# Patient Record
Sex: FEMALE | Race: WHITE | NOT HISPANIC OR LATINO | Employment: FULL TIME | URBAN - NONMETROPOLITAN AREA
[De-identification: names, ages, dates, MRNs, and addresses within clinical notes are randomized per-mention and may not be internally consistent; named-entity substitution may affect disease eponyms.]

---

## 2017-01-19 ENCOUNTER — OFFICE VISIT - GICH (OUTPATIENT)
Dept: FAMILY MEDICINE | Facility: OTHER | Age: 37
End: 2017-01-19

## 2017-01-19 ENCOUNTER — HISTORY (OUTPATIENT)
Dept: FAMILY MEDICINE | Facility: OTHER | Age: 37
End: 2017-01-19

## 2017-01-19 DIAGNOSIS — B96.89 OTHER SPECIFIED BACTERIAL AGENTS AS THE CAUSE OF DISEASES CLASSIFIED ELSEWHERE: ICD-10-CM

## 2017-01-19 DIAGNOSIS — N89.8 OTHER SPECIFIED NONINFLAMMATORY DISORDERS OF VAGINA (CODE): ICD-10-CM

## 2017-01-19 DIAGNOSIS — B37.31 CANDIDIASIS OF VULVA AND VAGINA: ICD-10-CM

## 2017-01-19 DIAGNOSIS — N76.0 ACUTE VAGINITIS: ICD-10-CM

## 2017-02-28 ENCOUNTER — OFFICE VISIT - GICH (OUTPATIENT)
Dept: FAMILY MEDICINE | Facility: OTHER | Age: 37
End: 2017-02-28

## 2017-02-28 DIAGNOSIS — B96.89 OTHER SPECIFIED BACTERIAL AGENTS AS THE CAUSE OF DISEASES CLASSIFIED ELSEWHERE: ICD-10-CM

## 2017-02-28 DIAGNOSIS — N76.0 ACUTE VAGINITIS: ICD-10-CM

## 2017-02-28 DIAGNOSIS — N89.8 OTHER SPECIFIED NONINFLAMMATORY DISORDERS OF VAGINA (CODE): ICD-10-CM

## 2017-04-13 ENCOUNTER — COMMUNICATION - GICH (OUTPATIENT)
Dept: FAMILY MEDICINE | Facility: OTHER | Age: 37
End: 2017-04-13

## 2017-04-13 DIAGNOSIS — L29.89 OTHER PRURITUS: ICD-10-CM

## 2018-01-03 NOTE — NURSING NOTE
Patient Information     Patient Name MRN Jailene Lopez [id number] Female 1980      Nursing Note by Kelley Martin at 2017  5:00 PM     Author:  Kelley Martin Service:  (none) Author Type:  NURS- Student Practical Nurse     Filed:  2017  5:24 PM Encounter Date:  2017 Status:  Signed     :  Kelley Martin (NURS- Student Practical Nurse)            Patient presents with bacterial vaginosis. Patient states she feels that it never went away. Patient has vaginal odor, cramping. Patient only notice during or after intercourse. Kelley Martin LPN .............2017  5:17 PM

## 2018-01-03 NOTE — PATIENT INSTRUCTIONS
Patient Information     Patient Name Jailene Lambert [id number] Female 1980      Patient Instructions by Pepper Jones NP at 2017  5:47 PM     Author:  Pepper Jones NP  Service:  (none) Author Type:  PHYS- Nurse Practitioner     Filed:  2017  5:47 PM  Encounter Date:  2017 Status:  Addendum     :  Pepper Jones NP (PHYS- Nurse Practitioner)        Related Notes: Original Note by Pepper Jones NP (PHYS- Nurse Practitioner) filed at 2017  5:47 PM               Index Mongolian Related topics   Vaginal Bacteria Overgrowth (Bacterial Vaginosis)   ________________________________________________________________________  KEY POINTS    Bacterial vaginosis (BV) is an overgrowth of a certain type of bacteria in the vagina (birth canal). It is a common condition that may or may not cause symptoms.    Untreated BV may go away on its own. If BV is causing itching, pain, or other problems, you may need antibiotic medicine.    Ask your healthcare provider if there are activities you should avoid and when you can return to your normal activities.  ________________________________________________________________________  What is bacterial vaginosis?  Bacterial vaginosis (BV) is an overgrowth of a certain type of bacteria in the vagina (birth canal). It is a common condition that may or may not cause symptoms.   What is the cause?  It s normal to have some bacteria in the vagina, but sometimes there are too many of certain types of bacteria. Doctors don t know what causes this imbalance of bacteria. One possible cause is douching (cleaning out the vagina with water or other fluids).  Most cases of bacterial vaginosis happen in sexually active women. Women who have more than 1 sexual partner have a greater risk of this problem. However, women who are not sexually active can also have vaginosis. Smoking cigarettes also increases the risk.  What are the symptoms?  Many women don t  have any symptoms. When women do have symptoms, the most common symptom is a discharge from the vagina. The discharge may be gray or yellowish and smell bad. For example, it may smell fishy, especially after sex. You may also have itching around the opening of the vagina. Sometimes BV can cause pain or burning in the vagina that does not go away.  How is it diagnosed?  Your healthcare provider will ask about your symptoms and medical history. You will have a pelvic exam, and your provider will get a sample of vaginal discharge for lab tests.  How is it treated?   Untreated bacterial vaginosis sometimes goes away on its own. Sometimes, if you scratch the area to relieve itching, you may get an infection. Rarely, it may cause vaginal pain that keeps bothering you. If bacterial vaginosis is causing itching, pain, or other problems, it may need to be treated with antibiotics. The medicine for bacterial vaginosis may be taken by mouth or it may be a cream or gel that you put into the vagina. The symptoms usually go away within a few days after you start treatment.   How can I take care of myself?  Follow your healthcare provider's instructions. Ask your provider:    How and when you will get your test results    How long it will take to recover    If there are activities you should avoid and when you can return to your normal activities    How to take care of yourself at home    What symptoms or problems you should watch for and what to do if you have them  Make sure you know when you should come back for a checkup. Keep all appointments for provider visits or tests.  How can I help prevent bacterial vaginosis?    Use latex or polyurethane condoms during foreplay and every time you have vaginal, oral, or anal sex.    Have just 1 sexual partner who is not having sex with anyone else.    If you have had sex and are worried that you may have been infected, see your healthcare provider even if you don t have any  symptoms.    Do not douche.  Developed by [employer].  Adult Advisor 2016.2 published by [employer].  Last modified: 2016-03-23  Last reviewed: 2015-11-02  This content is reviewed periodically and is subject to change as new health information becomes available. The information is intended to inform and educate and is not a replacement for medical evaluation, advice, diagnosis or treatment by a healthcare professional.  References   Adult Advisor 2016.2 Index    Copyright   2016 [employer], a division of [employer] All rights reserved.

## 2018-01-03 NOTE — PROGRESS NOTES
Patient Information     Patient Name MRN Sex Jailene Vincent [id number] Female 1980      Progress Notes by Pepper Jones NP at 2017  4:00 PM     Author:  Pepper Jones NP Service:  (none) Author Type:  PHYS- Nurse Practitioner     Filed:  3/2/2017  2:07 PM Encounter Date:  2017 Status:  Signed     :  Pepper Jones NP (PHYS- Nurse Practitioner)            HPI:    Jailene New is a 36 y.o. female who presents to clinic today for vaginal concerns. She is having increased odor after intercourse. No vaginal discharge, dysuria. She was tx for BV and yeast 2017, sx did improve. Concerned about a yeast infection, would like to have this tested. She is on doxycycline for acne, this started 2017.     Past Medical History      Diagnosis   Date     ADD (attention deficit disorder with hyperactivity)       teen onset/meds thru [facility]      History of pregnancy       G2Para 2-0-0-2      Past Surgical History       Procedure   Laterality Date     Jaw surgery         section   2009     breech presentation       Bunionectomy   3-     left       Social History       Substance Use Topics         Smoking status:   Never Smoker     Smokeless tobacco:   Never Used     Alcohol use   Yes      Comment: occasionally      Current Outpatient Prescriptions       Medication  Sig Dispense Refill     ADDERALL XR 20 mg Extended-Release capsule TAKE 1 CAPSULE BY MOUTH EVERY DAY  IN THE MORNING AND TAKE 1 CAPSULE BY MOUTH EVERY DAY AT 11 IN THE MORNING (FILL AFTER 2016)  0     cyclobenzaprine (FLEXERIL) 10 mg tablet Take 10 mg by mouth once daily.  0     dextroamphetamine-amphetamine (ADDERALL) 10 mg tablet TAKE ONE TABLET BY MOUTH AT 2PM (FILL AFTER 2016)  0     HYDROcodone-acetaminophen,  mg, (NORCO )  mg per tablet Take 10 tablets by mouth every 6 hours if needed  0     ibuprofen (ADVIL; MOTRIN) 600 mg tablet Take 600 mg by mouth every 6 hours if  needed.  0     TRINESSA LO 0.18/0.215/0.25 mg-25 mcg tablet TK 1 T PO ONE TIME A DAY  3     valACYclovir (VALTREX) 500 mg tablet Take 500 mg by mouth one time. Take 4 tablets by mouth at first sign of attack and 4 more 12 hours later  2     No current facility-administered medications for this visit.      Medications have been reviewed by me and are current to the best of my knowledge and ability.    No Known Allergies    ROS:  Pertinent positives and negatives are noted in HPI.    EXAM:  General appearance: well appearing female, in no acute distress  Genitourinary Exam Female: External genitalia, vulva and vagina appear normal. Speculum exam reveals scant clear drainage. Bimanual exam reveals normal uterus and adnexa with no masses, nontender urethra and bladder.   Psychological: normal affect, alert and pleasant  Lab:   Results for orders placed or performed in visit on 02/28/17      WET PREP GENITAL      Result  Value Ref Range    TRICHOMONAS               None Seen None Seen    YEAST                     None Seen None Seen    CLUE CELLS                Present (A) None Seen         ASSESSMENT/PLAN:    ICD-10-CM    1. BV (bacterial vaginosis) N76.0 metroNIDAZOLE 0.75% vaginal (METROGEL) 0.75 % vaginal gel     B96.89    2. Vaginal odor N89.8 WET PREP GENITAL      WET PREP GENITAL   Testing shows BV. Tx with vaginal metronidazole. Reviewed need to complete all antibiotics. Discussed typical course of illness, symptomatic treatment and when to return to clinic. Patient in agreement with plan and all questions were answered.     Patient Instructions      Index English Related topics   Vaginal Bacteria Overgrowth (Bacterial Vaginosis)   ________________________________________________________________________  KEY POINTS    Bacterial vaginosis (BV) is an overgrowth of a certain type of bacteria in the vagina (birth canal). It is a common condition that may or may not cause symptoms.    Untreated BV may go away on its  own. If BV is causing itching, pain, or other problems, you may need antibiotic medicine.    Ask your healthcare provider if there are activities you should avoid and when you can return to your normal activities.  ________________________________________________________________________  What is bacterial vaginosis?  Bacterial vaginosis (BV) is an overgrowth of a certain type of bacteria in the vagina (birth canal). It is a common condition that may or may not cause symptoms.   What is the cause?  It s normal to have some bacteria in the vagina, but sometimes there are too many of certain types of bacteria. Doctors don t know what causes this imbalance of bacteria. One possible cause is douching (cleaning out the vagina with water or other fluids).  Most cases of bacterial vaginosis happen in sexually active women. Women who have more than 1 sexual partner have a greater risk of this problem. However, women who are not sexually active can also have vaginosis. Smoking cigarettes also increases the risk.  What are the symptoms?  Many women don t have any symptoms. When women do have symptoms, the most common symptom is a discharge from the vagina. The discharge may be gray or yellowish and smell bad. For example, it may smell fishy, especially after sex. You may also have itching around the opening of the vagina. Sometimes BV can cause pain or burning in the vagina that does not go away.  How is it diagnosed?  Your healthcare provider will ask about your symptoms and medical history. You will have a pelvic exam, and your provider will get a sample of vaginal discharge for lab tests.  How is it treated?   Untreated bacterial vaginosis sometimes goes away on its own. Sometimes, if you scratch the area to relieve itching, you may get an infection. Rarely, it may cause vaginal pain that keeps bothering you. If bacterial vaginosis is causing itching, pain, or other problems, it may need to be treated with antibiotics. The  medicine for bacterial vaginosis may be taken by mouth or it may be a cream or gel that you put into the vagina. The symptoms usually go away within a few days after you start treatment.   How can I take care of myself?  Follow your healthcare provider's instructions. Ask your provider:    How and when you will get your test results    How long it will take to recover    If there are activities you should avoid and when you can return to your normal activities    How to take care of yourself at home    What symptoms or problems you should watch for and what to do if you have them  Make sure you know when you should come back for a checkup. Keep all appointments for provider visits or tests.  How can I help prevent bacterial vaginosis?    Use latex or polyurethane condoms during foreplay and every time you have vaginal, oral, or anal sex.    Have just 1 sexual partner who is not having sex with anyone else.    If you have had sex and are worried that you may have been infected, see your healthcare provider even if you don t have any symptoms.    Do not douche.  Developed by [employer].  Adult Advisor 2016.3 published by [employer].  Last modified: 2016-03-23  Last reviewed: 2015-11-02  This content is reviewed periodically and is subject to change as new health information becomes available. The information is intended to inform and educate and is not a replacement for medical evaluation, advice, diagnosis or treatment by a healthcare professional.  References   Adult Advisor 2016.3 Index    Copyright   2016 [employer], a division of [employer] All rights reserved.

## 2018-01-03 NOTE — PATIENT INSTRUCTIONS
Patient Information     Patient Name Jailene Lambert [id number] Female 1980      Patient Instructions by Pepper Jones NP at 2017  5:18 PM     Author:  Pepper Jones NP  Service:  (none) Author Type:  PHYS- Nurse Practitioner     Filed:  2017  5:19 PM  Encounter Date:  2017 Status:  Addendum     :  Pepper Jones NP (PHYS- Nurse Practitioner)        Related Notes: Original Note by Pepper Jones NP (PHYS- Nurse Practitioner) filed at 2017  5:18 PM               Index Venezuelan Related topics   Vaginal Bacteria Overgrowth (Bacterial Vaginosis)   ________________________________________________________________________  KEY POINTS    Bacterial vaginosis (BV) is an overgrowth of a certain type of bacteria in the vagina (birth canal). It is a common condition that may or may not cause symptoms.    Untreated BV may go away on its own. If BV is causing itching, pain, or other problems, you may need antibiotic medicine.    Ask your healthcare provider if there are activities you should avoid and when you can return to your normal activities.  ________________________________________________________________________  What is bacterial vaginosis?  Bacterial vaginosis (BV) is an overgrowth of a certain type of bacteria in the vagina (birth canal). It is a common condition that may or may not cause symptoms.   What is the cause?  It s normal to have some bacteria in the vagina, but sometimes there are too many of certain types of bacteria. Doctors don t know what causes this imbalance of bacteria. One possible cause is douching (cleaning out the vagina with water or other fluids).  Most cases of bacterial vaginosis happen in sexually active women. Women who have more than 1 sexual partner have a greater risk of this problem. However, women who are not sexually active can also have vaginosis. Smoking cigarettes also increases the risk.  What are the symptoms?  Many women don t  have any symptoms. When women do have symptoms, the most common symptom is a discharge from the vagina. The discharge may be gray or yellowish and smell bad. For example, it may smell fishy, especially after sex. You may also have itching around the opening of the vagina. Sometimes BV can cause pain or burning in the vagina that does not go away.  How is it diagnosed?  Your healthcare provider will ask about your symptoms and medical history. You will have a pelvic exam, and your provider will get a sample of vaginal discharge for lab tests.  How is it treated?   Untreated bacterial vaginosis sometimes goes away on its own. Sometimes, if you scratch the area to relieve itching, you may get an infection. Rarely, it may cause vaginal pain that keeps bothering you. If bacterial vaginosis is causing itching, pain, or other problems, it may need to be treated with antibiotics. The medicine for bacterial vaginosis may be taken by mouth or it may be a cream or gel that you put into the vagina. The symptoms usually go away within a few days after you start treatment.   How can I take care of myself?  Follow your healthcare provider's instructions. Ask your provider:    How and when you will get your test results    How long it will take to recover    If there are activities you should avoid and when you can return to your normal activities    How to take care of yourself at home    What symptoms or problems you should watch for and what to do if you have them  Make sure you know when you should come back for a checkup. Keep all appointments for provider visits or tests.  How can I help prevent bacterial vaginosis?    Use latex or polyurethane condoms during foreplay and every time you have vaginal, oral, or anal sex.    Have just 1 sexual partner who is not having sex with anyone else.    If you have had sex and are worried that you may have been infected, see your healthcare provider even if you don t have any  symptoms.    Do not douche.  Developed by [employer].  Adult Advisor 2016.3 published by [employer].  Last modified: 2016-03-23  Last reviewed: 2015-11-02  This content is reviewed periodically and is subject to change as new health information becomes available. The information is intended to inform and educate and is not a replacement for medical evaluation, advice, diagnosis or treatment by a healthcare professional.  References   Adult Advisor 2016.3 Index    Copyright   2016 [employer], a division of [employer] All rights reserved.

## 2018-01-03 NOTE — PROGRESS NOTES
Patient Information     Patient Name MRN Sex Jailene Vincent [id number] Female 1980      Progress Notes by Pepper Jones NP at 2017  5:00 PM     Author:  Pepper Jones NP Service:  (none) Author Type:  PHYS- Nurse Practitioner     Filed:  2017  5:58 PM Encounter Date:  2017 Status:  Signed     :  Pepper Jones NP (PHYS- Nurse Practitioner)            HPI:    Jailene New is a 36 y.o. female who presents to clinic today for vaginal concerns. She was treated for BV a couple months ago. Does not feel sx fully resolved. She is having pelvic cramping and increased vaginal odor. No increase in discharge. She denies any dysuria. No changes in sexual partners recently. Denies STD concerns.     Past Medical History      Diagnosis   Date     ADD (attention deficit disorder with hyperactivity)       teen onset/meds thru [facility]      History of pregnancy       G2Para 2-0-0-2      Past Surgical History       Procedure   Laterality Date     Jaw surgery         section   2009     breech presentation       Bunionectomy   3-     left       Social History       Substance Use Topics         Smoking status:   Never Smoker     Smokeless tobacco:   Never Used     Alcohol use   Yes      Comment: occasionally      Current Outpatient Prescriptions       Medication  Sig Dispense Refill     ADDERALL XR 20 mg Extended-Release capsule TAKE 1 CAPSULE BY MOUTH EVERY DAY  IN THE MORNING AND TAKE 1 CAPSULE BY MOUTH EVERY DAY AT 11 IN THE MORNING (FILL AFTER 2016)  0     dextroamphetamine-amphetamine (ADDERALL) 10 mg tablet TAKE ONE TABLET BY MOUTH AT 2PM (FILL AFTER 2016)  0     TRINESSA LO 0.18/0.215/0.25 mg-25 mcg tablet TK 1 T PO ONE TIME A DAY  3     No current facility-administered medications for this visit.      Medications have been reviewed by me and are current to the best of my knowledge and ability.    No Known Allergies    ROS:  Pertinent positives and  negatives are noted in HPI.    EXAM:  General appearance: well appearing female, in no acute distress  Genitourinary Exam Female: External genitalia, vulva and vagina appear normal. Normal Vaginal exam. Wet prep obtained.   Psychological: normal affect, alert and pleasant  Lab:   Results for orders placed or performed in visit on 01/19/17      WET PREP GENITAL      Result  Value Ref Range    TRICHOMONAS               None Seen None Seen    YEAST                     Present (A) None Seen    CLUE CELLS                Present (A) None Seen         ASSESSMENT/PLAN:    ICD-10-CM    1. Vaginal irritation N89.8 WET PREP GENITAL      WET PREP GENITAL   2. Yeast vaginitis B37.3 fluconazole (DIFLUCAN) 150 mg tablet   3. Bacterial vaginosis N76.0 metroNIDAZOLE (FLAGYL) 500 mg tablet     B96.89    Wet prep shows positive for yeast and bacterial vaginosis. Tx with diflucan and metronidazole for these. Encouraged f/u with PCP if sx persist or do not improve. Reviewed need to complete all antibiotics. Discussed typical course of illness, symptomatic treatment and when to return to clinic. Patient in agreement with plan and all questions were answered.     Patient Instructions      Index Welsh Related topics   Vaginal Bacteria Overgrowth (Bacterial Vaginosis)   ________________________________________________________________________  KEY POINTS    Bacterial vaginosis (BV) is an overgrowth of a certain type of bacteria in the vagina (birth canal). It is a common condition that may or may not cause symptoms.    Untreated BV may go away on its own. If BV is causing itching, pain, or other problems, you may need antibiotic medicine.    Ask your healthcare provider if there are activities you should avoid and when you can return to your normal activities.  ________________________________________________________________________  What is bacterial vaginosis?  Bacterial vaginosis (BV) is an overgrowth of a certain type of bacteria in  the vagina (birth canal). It is a common condition that may or may not cause symptoms.   What is the cause?  It s normal to have some bacteria in the vagina, but sometimes there are too many of certain types of bacteria. Doctors don t know what causes this imbalance of bacteria. One possible cause is douching (cleaning out the vagina with water or other fluids).  Most cases of bacterial vaginosis happen in sexually active women. Women who have more than 1 sexual partner have a greater risk of this problem. However, women who are not sexually active can also have vaginosis. Smoking cigarettes also increases the risk.  What are the symptoms?  Many women don t have any symptoms. When women do have symptoms, the most common symptom is a discharge from the vagina. The discharge may be gray or yellowish and smell bad. For example, it may smell fishy, especially after sex. You may also have itching around the opening of the vagina. Sometimes BV can cause pain or burning in the vagina that does not go away.  How is it diagnosed?  Your healthcare provider will ask about your symptoms and medical history. You will have a pelvic exam, and your provider will get a sample of vaginal discharge for lab tests.  How is it treated?   Untreated bacterial vaginosis sometimes goes away on its own. Sometimes, if you scratch the area to relieve itching, you may get an infection. Rarely, it may cause vaginal pain that keeps bothering you. If bacterial vaginosis is causing itching, pain, or other problems, it may need to be treated with antibiotics. The medicine for bacterial vaginosis may be taken by mouth or it may be a cream or gel that you put into the vagina. The symptoms usually go away within a few days after you start treatment.   How can I take care of myself?  Follow your healthcare provider's instructions. Ask your provider:    How and when you will get your test results    How long it will take to recover    If there are  activities you should avoid and when you can return to your normal activities    How to take care of yourself at home    What symptoms or problems you should watch for and what to do if you have them  Make sure you know when you should come back for a checkup. Keep all appointments for provider visits or tests.  How can I help prevent bacterial vaginosis?    Use latex or polyurethane condoms during foreplay and every time you have vaginal, oral, or anal sex.    Have just 1 sexual partner who is not having sex with anyone else.    If you have had sex and are worried that you may have been infected, see your healthcare provider even if you don t have any symptoms.    Do not douche.  Developed by [employer].  Adult Advisor 2016.2 published by [employer].  Last modified: 2016-03-23  Last reviewed: 2015-11-02  This content is reviewed periodically and is subject to change as new health information becomes available. The information is intended to inform and educate and is not a replacement for medical evaluation, advice, diagnosis or treatment by a healthcare professional.  References   Adult Advisor 2016.2 Index    Copyright   2016 [employer], a division of [employer] All rights reserved.

## 2018-01-04 NOTE — TELEPHONE ENCOUNTER
Patient Information     Patient Name MRN Sex Jailene Vincent [id number] Female 1980      Telephone Encounter by Gosselin, Norma J at 2017  3:40 PM     Author:  Gosselin, Norma J Service:  (none) Author Type:  (none)     Filed:  2017  3:40 PM Encounter Date:  2017 Status:  Signed     :  Gosselin, Norma J            After patient's name and date of birth verified, patient given the below information.  Norma J Gosselin LPN....................  2017   3:40 PM

## 2018-01-04 NOTE — TELEPHONE ENCOUNTER
Patient Information     Patient Name MRN Jailene Lopez [id number] Female 1980      Telephone Encounter by Ariana Nogueira PA-C at 2017  3:33 PM     Author:  Ariana Nogueira PA-C Service:  (none) Author Type:  PHYS- Physician Assistant     Filed:  2017  3:34 PM Encounter Date:  2017 Status:  Signed     :  Ariana Nogueira PA-C (PHYS- Physician Assistant)            Sent diflucan to the pharmacy.   Ariana Nogueira PA-C ....................  2017   3:33 PM

## 2018-01-04 NOTE — TELEPHONE ENCOUNTER
"Patient Information     Patient Name MRN Jailene oLpez [id number] Female 1980      Telephone Encounter by Austin Gold RN at 2017 12:37 PM     Author:  Austin Gold RN Service:  (none) Author Type:  NURS- Registered Nurse     Filed:  2017 12:50 PM Encounter Date:  2017 Status:  Signed     :  Austin Gold RN (NURS- Registered Nurse)            Patient states she had tried monistat over the counter. Provided minimal relief of overall symptoms but did help with the itching. Patient doesn't feel that her symptoms are consistent with bacterial vaginosis (had this per chart review on 17). Would like to be treated with oral Diflucan if possible. Patient understands that if diflucan is ordered and she doesn't see improvement that she will need to be seen in the office. Patient will call back or be seen with worsening symptoms. Would like request sent to PCP's teamlet for her consideration/approval since PCP is out of the office and would like a call back today please.    Reason for Disposition    Abnormal color vaginal discharge (i.e., yellow, green, gray)    Answer Assessment - Initial Assessment Questions  1. DISCHARGE: \"Describe the discharge.\" (e.g., white, yellow, green, gray, foamy, cottage cheese-like)      Yes is having vaginal discharge. Is a whitish yellow. Is cottage cheese consistency  2. ODOR: \"Is there a bad odor?\"      No  3. ONSET: \"When did the discharge begin?\"      Started last week, beginning of last week.  4. RASH: \"Is there a rash in that area?\" If so, ask: \"Describe it.\" (e.g., redness, blisters, sores, bumps)      Itches, did use monistat and that helped with symptoms  5. ABDOMINAL PAIN: \"Are you having any abdominal pain?\" If yes: \"What does it feel like? \" (e.g., crampy, dull, intermittent, constant)       No  6. ABDOMINAL PAIN SEVERITY: If present, ask: \"How bad is it?\"  (e.g., mild, moderate, severe)   - MILD - doesn't interfere with normal " "activities    - MODERATE - interferes with normal activities or awakens from sleep    - SEVERE - patient doesn't want to move (R/O peritonitis)       N/A  7. CAUSE: \"What do you think is causing the discharge?\"      Yeast infection  8. OTHER SYMPTOMS: \"Do you have any other symptoms?\" (e.g., fever, itching, vaginal bleeding, pain with urination)      Itching, does have some pain with urination  9. PREGNANCY: \"Is there any chance you are pregnant?\" \"When was your last menstrual period?\"      Denies    Protocols used: ADULT VAGINAL NNAJTCTOR-S-BZ    Austin Gold RN ....................  4/13/2017   12:49 PM        "

## 2018-01-26 VITALS
TEMPERATURE: 97.8 F | HEIGHT: 67 IN | SYSTOLIC BLOOD PRESSURE: 122 MMHG | DIASTOLIC BLOOD PRESSURE: 76 MMHG | WEIGHT: 130 LBS | BODY MASS INDEX: 20.4 KG/M2 | HEART RATE: 60 BPM

## 2018-01-26 VITALS — HEART RATE: 80 BPM | HEIGHT: 67 IN | WEIGHT: 133 LBS | TEMPERATURE: 98.7 F | BODY MASS INDEX: 20.88 KG/M2

## 2018-01-31 ENCOUNTER — DOCUMENTATION ONLY (OUTPATIENT)
Dept: FAMILY MEDICINE | Facility: OTHER | Age: 38
End: 2018-01-31

## 2018-01-31 PROBLEM — T78.40XA ALLERGIC STATE: Status: ACTIVE | Noted: 2018-01-31

## 2018-01-31 PROBLEM — F98.8 ATTENTION DEFICIT DISORDER: Status: ACTIVE | Noted: 2018-01-31

## 2018-01-31 PROBLEM — Z30.9 CONTRACEPTIVE MANAGEMENT: Status: ACTIVE | Noted: 2018-01-31

## 2018-01-31 PROBLEM — F41.1 ANXIETY STATE: Status: ACTIVE | Noted: 2018-01-31

## 2018-01-31 RX ORDER — NORGESTIMATE AND ETHINYL ESTRADIOL 7DAYSX3 LO
1 KIT ORAL DAILY
Status: ON HOLD | COMMUNITY
Start: 2016-11-04 | End: 2018-04-13

## 2018-01-31 RX ORDER — VALACYCLOVIR HYDROCHLORIDE 500 MG/1
500 TABLET, FILM COATED ORAL
COMMUNITY
Start: 2017-02-15 | End: 2023-05-09

## 2018-01-31 RX ORDER — DEXTROAMPHETAMINE SACCHARATE, AMPHETAMINE ASPARTATE, DEXTROAMPHETAMINE SULFATE AND AMPHETAMINE SULFATE 2.5; 2.5; 2.5; 2.5 MG/1; MG/1; MG/1; MG/1
1 TABLET ORAL
COMMUNITY
Start: 2016-11-05 | End: 2018-04-10

## 2018-01-31 RX ORDER — IBUPROFEN 600 MG/1
600 TABLET, FILM COATED ORAL EVERY 6 HOURS PRN
COMMUNITY
Start: 2017-02-04 | End: 2019-11-18

## 2018-01-31 RX ORDER — DEXTROAMPHETAMINE SACCHARATE, AMPHETAMINE ASPARTATE MONOHYDRATE, DEXTROAMPHETAMINE SULFATE AND AMPHETAMINE SULFATE 5; 5; 5; 5 MG/1; MG/1; MG/1; MG/1
CAPSULE, EXTENDED RELEASE ORAL
COMMUNITY
Start: 2016-11-05 | End: 2018-06-06

## 2018-01-31 RX ORDER — CYCLOBENZAPRINE HCL 10 MG
10 TABLET ORAL DAILY
COMMUNITY
Start: 2017-02-04 | End: 2018-04-05

## 2018-01-31 RX ORDER — HYDROCODONE BITARTRATE AND ACETAMINOPHEN 10; 325 MG/1; MG/1
10 TABLET ORAL EVERY 6 HOURS PRN
COMMUNITY
Start: 2017-02-04 | End: 2018-04-05

## 2018-04-05 ENCOUNTER — OFFICE VISIT (OUTPATIENT)
Dept: SURGERY | Facility: OTHER | Age: 38
End: 2018-04-05
Attending: SURGERY
Payer: COMMERCIAL

## 2018-04-05 VITALS
DIASTOLIC BLOOD PRESSURE: 90 MMHG | BODY MASS INDEX: 19.7 KG/M2 | HEIGHT: 68 IN | SYSTOLIC BLOOD PRESSURE: 138 MMHG | WEIGHT: 130 LBS

## 2018-04-05 DIAGNOSIS — Z30.2 REQUEST FOR STERILIZATION: ICD-10-CM

## 2018-04-05 DIAGNOSIS — K43.9 EPIGASTRIC HERNIA: Primary | ICD-10-CM

## 2018-04-05 PROCEDURE — 99203 OFFICE O/P NEW LOW 30 MIN: CPT | Performed by: SURGERY

## 2018-04-05 NOTE — MR AVS SNAPSHOT
"              After Visit Summary   4/5/2018    Jailene New    MRN: [id number]           Patient Information     Date Of Birth          1980        Visit Information        Provider Department      4/5/2018 3:50 PM Bobby Jones MD [facility]        Today's Diagnoses     Epigastric hernia    -  1    Request for sterilization           Follow-ups after your visit        Your next 10 appointments already scheduled     Apr 26, 2018  3:40 PM CDT   Return Visit with Bobby Jones MD   [facility] ([facility])    [address]  [place] [id number] [phone]              Who to contact     If you have questions or need follow up information about today's clinic visit or your schedule please contact [facility] directly at [phone].  Normal or non-critical lab and imaging results will be communicated to you by [employer], letter or phone within 4 business days after the clinic has received the results. If you do not hear from us within 7 days, please contact the clinic through [employer] or phone. If you have a critical or abnormal lab result, we will notify you by phone as soon as possible.  Submit refill requests through [employer] or call your pharmacy and they will forward the refill request to us. Please allow 3 business days for your refill to be completed.          Additional Information About Your Visit        MyChart Information     HitFix lets you send messages to your doctor, view your test results, renew your prescriptions, schedule appointments and more. To sign up, go to [email or web address] . Click on \"Log in\" on the left side of the screen, which will take you to the Welcome page. Then click on \"Sign up Now\" on the right side of the page.     You will be asked to enter the access code listed below, as well as some personal information. Please follow the directions to create your username " "and password.     Your access code is: [email or web address]  Expires: 2018  4:30 PM     Your access code will  in 90 days. If you need help or a new code, please call your [place] clinic or [phone].        Care EveryWhere ID     This is your Care EveryWhere ID. This could be used by other organizations to access your [place] medical records  [id number]        Your Vitals Were     Height Last Period Breastfeeding? BMI (Body Mass Index)          5' 7.5\" (1.715 m) 2018 (Approximate) No 20.06 kg/m2         Blood Pressure from Last 3 Encounters:   18 138/90   17 122/76   16 120/72    Weight from Last 3 Encounters:   18 130 lb (59 kg)   17 133 lb (60.3 kg)   17 130 lb (59 kg)              Today, you had the following     No orders found for display         Today's Medication Changes          These changes are accurate as of 18  4:30 PM.  If you have any questions, ask your nurse or doctor.               Stop taking these medicines if you haven't already. Please contact your care team if you have questions.     cyclobenzaprine 10 MG tablet   Commonly known as:  FLEXERIL   Stopped by:  Bobby Jones MD           HYDROcodone-acetaminophen  MG per tablet   Commonly known as:  NORCO   Stopped by:  Bobby Jones MD                    Primary Care Provider Office Phone # Fax #    Kayla C Meng Olvera [phone] [address] [facility] [address]        Equal Access to Services     [facility] AH: Hadii kira Khan, wanataliada lulina, qaybta zairaalmichelle hernandez. So [facility] [phone].    ATENCIÓN: Si habla español, tiene a torre disposición servicios gratuitos de asistencia lingüística. Llame al [phone].    We comply with applicable federal civil rights laws and [place] laws. We do not discriminate on the basis of race, color, national origin, age, disability, sex, " sexual orientation, or gender identity.            Thank you!     Thank you for choosing [facility] AND [facility]  for your care. Our goal is always to provide you with excellent care. Hearing back from our patients is one way we can continue to improve our services. Please take a few minutes to complete the written survey that you may receive in the mail after your visit with us. Thank you!             Your Updated Medication List - Protect others around you: Learn how to safely use, store and throw away your medicines at www.disposemymeds.org.          This list is accurate as of 4/5/18  4:30 PM.  Always use your most recent med list.                   Brand Name Dispense Instructions for use Diagnosis    * ADDERALL XR 20 MG per 24 hr capsule   Generic drug:  amphetamine-dextroamphetamine      TAKE 1 CAPSULE BY MOUTH EVERY DAY  IN THE MORNING AND TAKE 1 CAPSULE BY MOUTH EVERY DAY AT 11 IN THE MORNING (FILL AFTER 11/1/2016)        * amphetamine-dextroamphetamine 10 MG per tablet    ADDERALL     Take 1 tablet by mouth        ibuprofen 600 MG tablet    ADVIL/MOTRIN     Take 600 mg by mouth every 6 hours as needed        TRINESSA LO 0.18/0.215/0.25 MG-25 MCG per tablet   Generic drug:  norgestim-eth estrad triphasic      Take 1 tablet by mouth daily        valACYclovir 500 MG tablet    VALTREX     Take 500 mg by mouth        * Notice:  This list has 2 medication(s) that are the same as other medications prescribed for you. Read the directions carefully, and ask your doctor or other care provider to review them with you.

## 2018-04-05 NOTE — PROGRESS NOTES
GENERAL SURGERY OFFICE CONSULTATION NOTE    Patient Name: Jailene New  Address: [address] [place] [address]  Age:37 year old  Sex: female     Primary Care Physician: Kayla Dorantes    I wasrequested to see this patient in consultation by Kayla Dorantes for evaluation of abdominal pain/bulge. A copy of my findings and recommendations will be sent to Kayla Dorantes.    HPI:   The patient is 37 year old female with umbilicalbulge and pain. The patient first noted this a few weeks ago. The bulge has been getting bigger and more uncomfortable. No nausea or vomiting. No problems with new diarrhea or constipation. No skin redness or rash. No previous umbilical hernia surgery. Previous  X 2.  Pt would like to pursue tubal ligation at time of hernia repair.       REVIEW OF SYSTEMS  GENERAL: No fevers or chills. Denies fatigue, recent weight loss.  HEENT: No sinus drainage. No changes with vision or hearing. No difficulty swallowing.   LYMPHATICS:  No swollen nodes inaxilla, neck or groin.  CARDIOVASCULAR: Denies chest pain, palpitations and dyspnea on exertion.  PULMONARY: No shortness of breath or cough. No increase in sputum production.  GI: Denies melena, bright red blood in stools. No hematemesis. No constipation or diarrhea.  : No dysuria or hematuria.  SKIN: No recent rashes or ulcers.   HEMATOLOGY:  No history of easy bruising or bleeding.  ENDOCRINE:  No history of diabetes orthyroid problems.  NEUROLOGY:  No history of seizures or headaches. No motor or sensory changes.     PAST MEDICAL HISTORY    Past Medical History:   Diagnosis Date     Attention-deficit hyperactivity disorder     teen onset/meds thru [facility]     Personal history of other medical treatment (CODE)     G2Para 2-0-0-2       PAST SURGICAL HISTORY    Past Surgical History:   Procedure Laterality Date     BUNIONECTOMY      3-,left      SECTION      ,breech presentation      "OTHER SURGICAL HISTORY      06/08,108917,OTHER       CURRENT MEDS  Current Outpatient Prescriptions   Medication     amphetamine-dextroamphetamine (ADDERALL XR) 20 MG per 24 hr capsule     amphetamine-dextroamphetamine (ADDERALL) 10 MG per tablet     ibuprofen (ADVIL/MOTRIN) 600 MG tablet     norgestim-eth estrad triphasic (TRINESSA LO) 0.18/0.215/0.25 MG-25 MCG per tablet     valACYclovir (VALTREX) 500 MG tablet     No current facility-administered medications for this visit.      ALLERGIES/SENSITIVITIES  No Known Allergies  FAMILY HISTORY    Family History   Problem Relation Age of Onset     Other - See Comments Mother      ADD     Other - See Comments Sister      ADD     Breast Cancer Maternal Grandmother      Cancer-breast,and lung cancer       SOCIALHISTORY    Social History     Social History     Marital status:      Spouse name: N/A     Number of children: N/A     Years of education: 16     Occupational History     Not on file.     Social History Main Topics     Smoking status: Never Smoker     Smokeless tobacco: Never Used     Alcohol use Yes      Comment: Alcoholic Drinks/day: occasionally     Drug use: Not on file      Comment: Drug use: No     Sexual activity: Yes     Partners: Male     Other Topics Concern     Not on file     Social History Narrative    Elementary education degree, in Masters program [employer] 2015.  [employer] teacher at [employer]    Children:  Yoselyn Griffith       2011       PHYSICAL EXAM  /90 (BP Location: Right arm, Patient Position: Sitting, Cuff Size: Adult Regular)  Ht 5' 7.5\" (1.715 m)  Wt 130 lb (59 kg)  LMP 03/13/2018 (Approximate)  Breastfeeding? No  BMI 20.06 kg/m2    Body mass index is 20.06 kg/(m^2).      GENERAL: Healthy appearing patient in no acute distress. Pleasant and cooperative with exam and interview.   HEENT: Head-normocephalic. Eyes-no scleralicterus, pupils equal, round, and reactive to light. Nose- no nasal drainage. No lesions. Mouth-oral mucosa " pink and moist, no lesions.  NECK: Supple. No thyroid nodules. Trachea midline.  LYMPHATICS:  No cervical,axillary or supraclavicular adenopathy.  CV: Regular rate and rhythm, no murmurs. No peripheral edema.  LUNGS:  No respiratory distress. Clear bilaterally to auscultation.  ABDOMEN: Non distended. Bowel soundsactive. Soft, non-tender, no hepatosplenomegaly. Epigastric hernia noted,  Mild tenderness, reducible. No peritoneal signs.  SKIN: Pink, warm and dry. No jaundice. No rash.  NEURO:  Cranial nerves II-XII grossly intact.Alert and oriented.  PSYCH: Appropriate mood and affect.    IMAGING/LAB      CONSULTATION ASSESSMENT AND PLAN/RECOMMENDATIONS:   37 year old female with epigastric ventral hernia and desire for sterilization.      I discussed with the patient the pathophysiology of ventral hernias. I explained the risks, benefits and alternatives to repair of epigastric hernia with mesh. We specifically discussed the risks of infection, bleeding, injury to intra-abdominal organs, mesh complications and hernia recurrence. We discussed post operative limitations and expected recovery time. The patient's questions were answered and the patient wishes to proceed with repair. Informed consent paperwork was completed. This will be scheduled at a time that is convenient for the patient. The patient will call with questions or concerns prior to the procedure.    Bobby Jones MD on 4/5/2018 at 4:25 PM

## 2018-04-10 ENCOUNTER — ANESTHESIA EVENT (OUTPATIENT)
Dept: SURGERY | Facility: OTHER | Age: 38
End: 2018-04-10
Payer: COMMERCIAL

## 2018-04-13 ENCOUNTER — HOSPITAL ENCOUNTER (OUTPATIENT)
Facility: OTHER | Age: 38
Discharge: HOME | End: 2018-04-13
Attending: SURGERY | Admitting: SURGERY
Payer: COMMERCIAL

## 2018-04-13 ENCOUNTER — SURGERY (OUTPATIENT)
Age: 38
End: 2018-04-13

## 2018-04-13 ENCOUNTER — ANESTHESIA (OUTPATIENT)
Dept: SURGERY | Facility: OTHER | Age: 38
End: 2018-04-13
Payer: COMMERCIAL

## 2018-04-13 VITALS
RESPIRATION RATE: 19 BRPM | TEMPERATURE: 98.8 F | SYSTOLIC BLOOD PRESSURE: 143 MMHG | BODY MASS INDEX: 20 KG/M2 | OXYGEN SATURATION: 99 % | WEIGHT: 129.6 LBS | DIASTOLIC BLOOD PRESSURE: 98 MMHG

## 2018-04-13 DIAGNOSIS — Z09 S/P EPIGASTRIC HERNIA REPAIR, FOLLOW-UP EXAM: Primary | ICD-10-CM

## 2018-04-13 LAB — HCG UR QL: NEGATIVE

## 2018-04-13 PROCEDURE — C1781 MESH (IMPLANTABLE): HCPCS | Performed by: SURGERY

## 2018-04-13 PROCEDURE — 25000125 ZZHC RX 250: Performed by: ANESTHESIOLOGY

## 2018-04-13 PROCEDURE — 58661 LAPAROSCOPY REMOVE ADNEXA: CPT

## 2018-04-13 PROCEDURE — 25000128 H RX IP 250 OP 636: Performed by: NURSE ANESTHETIST, CERTIFIED REGISTERED

## 2018-04-13 PROCEDURE — 37000009 ZZH ANESTHESIA TECHNICAL FEE, EACH ADDTL 15 MIN: Performed by: SURGERY

## 2018-04-13 PROCEDURE — 71000014 ZZH RECOVERY PHASE 1 LEVEL 2 FIRST HR: Performed by: SURGERY

## 2018-04-13 PROCEDURE — 81025 URINE PREGNANCY TEST: CPT | Performed by: SURGERY

## 2018-04-13 PROCEDURE — 25000132 ZZH RX MED GY IP 250 OP 250 PS 637: Performed by: SURGERY

## 2018-04-13 PROCEDURE — 58661 LAPAROSCOPY REMOVE ADNEXA: CPT | Performed by: ANESTHESIOLOGY

## 2018-04-13 PROCEDURE — 88302 TISSUE EXAM BY PATHOLOGIST: CPT

## 2018-04-13 PROCEDURE — 40000306 ZZH STATISTIC PRE PROC ASSESS II: Performed by: SURGERY

## 2018-04-13 PROCEDURE — 71000027 ZZH RECOVERY PHASE 2 EACH 15 MINS: Performed by: SURGERY

## 2018-04-13 PROCEDURE — 25000128 H RX IP 250 OP 636: Performed by: ANESTHESIOLOGY

## 2018-04-13 PROCEDURE — 25000125 ZZHC RX 250: Performed by: NURSE ANESTHETIST, CERTIFIED REGISTERED

## 2018-04-13 PROCEDURE — 36000058 ZZH SURGERY LEVEL 3 EA 15 ADDTL MIN: Performed by: SURGERY

## 2018-04-13 PROCEDURE — 27210794 ZZH OR GENERAL SUPPLY STERILE: Performed by: SURGERY

## 2018-04-13 PROCEDURE — 25000125 ZZHC RX 250: Performed by: SURGERY

## 2018-04-13 PROCEDURE — 49572 ZZHC REPAIR EPIGASTRIC HERNIA,STRANG: CPT | Performed by: SURGERY

## 2018-04-13 PROCEDURE — 37000008 ZZH ANESTHESIA TECHNICAL FEE, 1ST 30 MIN: Performed by: SURGERY

## 2018-04-13 PROCEDURE — 36000056 ZZH SURGERY LEVEL 3 1ST 30 MIN: Performed by: SURGERY

## 2018-04-13 PROCEDURE — 58661 LAPAROSCOPY REMOVE ADNEXA: CPT | Performed by: OBSTETRICS & GYNECOLOGY

## 2018-04-13 DEVICE — IMPLANTABLE DEVICE: Type: IMPLANTABLE DEVICE | Site: ABDOMEN | Status: FUNCTIONAL

## 2018-04-13 RX ORDER — OXYCODONE AND ACETAMINOPHEN 5; 325 MG/1; MG/1
1-2 TABLET ORAL EVERY 4 HOURS PRN
Qty: 30 TABLET | Refills: 0 | Status: SHIPPED | OUTPATIENT
Start: 2018-04-13 | End: 2018-04-27

## 2018-04-13 RX ORDER — CEFAZOLIN SODIUM 2 G/100ML
2 INJECTION, SOLUTION INTRAVENOUS
Status: DISCONTINUED | OUTPATIENT
Start: 2018-04-13 | End: 2018-04-13 | Stop reason: HOSPADM

## 2018-04-13 RX ORDER — PROPOFOL 10 MG/ML
INJECTION, EMULSION INTRAVENOUS CONTINUOUS PRN
Status: DISCONTINUED | OUTPATIENT
Start: 2018-04-13 | End: 2018-04-13

## 2018-04-13 RX ORDER — LIDOCAINE 40 MG/G
CREAM TOPICAL
Status: DISCONTINUED | OUTPATIENT
Start: 2018-04-13 | End: 2018-04-13 | Stop reason: HOSPADM

## 2018-04-13 RX ORDER — NALOXONE HYDROCHLORIDE 0.4 MG/ML
.1-.4 INJECTION, SOLUTION INTRAMUSCULAR; INTRAVENOUS; SUBCUTANEOUS
Status: DISCONTINUED | OUTPATIENT
Start: 2018-04-13 | End: 2018-04-13 | Stop reason: HOSPADM

## 2018-04-13 RX ORDER — KETOROLAC TROMETHAMINE 30 MG/ML
INJECTION, SOLUTION INTRAMUSCULAR; INTRAVENOUS PRN
Status: DISCONTINUED | OUTPATIENT
Start: 2018-04-13 | End: 2018-04-13

## 2018-04-13 RX ORDER — ALBUTEROL SULFATE 0.83 MG/ML
2.5 SOLUTION RESPIRATORY (INHALATION) EVERY 4 HOURS PRN
Status: DISCONTINUED | OUTPATIENT
Start: 2018-04-13 | End: 2018-04-13 | Stop reason: HOSPADM

## 2018-04-13 RX ORDER — FENTANYL CITRATE 50 UG/ML
25-50 INJECTION, SOLUTION INTRAMUSCULAR; INTRAVENOUS EVERY 5 MIN PRN
Status: DISCONTINUED | OUTPATIENT
Start: 2018-04-13 | End: 2018-04-13 | Stop reason: HOSPADM

## 2018-04-13 RX ORDER — NEOSTIGMINE METHYLSULFATE 1 MG/ML
VIAL (ML) INJECTION PRN
Status: DISCONTINUED | OUTPATIENT
Start: 2018-04-13 | End: 2018-04-13

## 2018-04-13 RX ORDER — HYDROMORPHONE HYDROCHLORIDE 1 MG/ML
.3-.5 INJECTION, SOLUTION INTRAMUSCULAR; INTRAVENOUS; SUBCUTANEOUS EVERY 10 MIN PRN
Status: DISCONTINUED | OUTPATIENT
Start: 2018-04-13 | End: 2018-04-13 | Stop reason: HOSPADM

## 2018-04-13 RX ORDER — DEXAMETHASONE SODIUM PHOSPHATE 4 MG/ML
INJECTION, SOLUTION INTRA-ARTICULAR; INTRALESIONAL; INTRAMUSCULAR; INTRAVENOUS; SOFT TISSUE PRN
Status: DISCONTINUED | OUTPATIENT
Start: 2018-04-13 | End: 2018-04-13

## 2018-04-13 RX ORDER — BUPIVACAINE HYDROCHLORIDE 2.5 MG/ML
INJECTION, SOLUTION EPIDURAL; INFILTRATION; INTRACAUDAL PRN
Status: DISCONTINUED | OUTPATIENT
Start: 2018-04-13 | End: 2018-04-13 | Stop reason: HOSPADM

## 2018-04-13 RX ORDER — OXYCODONE AND ACETAMINOPHEN 5; 325 MG/1; MG/1
1 TABLET ORAL
Status: COMPLETED | OUTPATIENT
Start: 2018-04-13 | End: 2018-04-13

## 2018-04-13 RX ORDER — ONDANSETRON 4 MG/1
4 TABLET, ORALLY DISINTEGRATING ORAL EVERY 30 MIN PRN
Status: DISCONTINUED | OUTPATIENT
Start: 2018-04-13 | End: 2018-04-13 | Stop reason: HOSPADM

## 2018-04-13 RX ORDER — FENTANYL CITRATE 50 UG/ML
50 INJECTION, SOLUTION INTRAMUSCULAR; INTRAVENOUS
Status: DISCONTINUED | OUTPATIENT
Start: 2018-04-13 | End: 2018-04-13 | Stop reason: HOSPADM

## 2018-04-13 RX ORDER — DEXAMETHASONE SODIUM PHOSPHATE 4 MG/ML
4 INJECTION, SOLUTION INTRA-ARTICULAR; INTRALESIONAL; INTRAMUSCULAR; INTRAVENOUS; SOFT TISSUE EVERY 10 MIN PRN
Status: DISCONTINUED | OUTPATIENT
Start: 2018-04-13 | End: 2018-04-13 | Stop reason: HOSPADM

## 2018-04-13 RX ORDER — PROPOFOL 10 MG/ML
INJECTION, EMULSION INTRAVENOUS PRN
Status: DISCONTINUED | OUTPATIENT
Start: 2018-04-13 | End: 2018-04-13

## 2018-04-13 RX ORDER — LIDOCAINE HYDROCHLORIDE 20 MG/ML
INJECTION, SOLUTION INFILTRATION; PERINEURAL PRN
Status: DISCONTINUED | OUTPATIENT
Start: 2018-04-13 | End: 2018-04-13

## 2018-04-13 RX ORDER — MEPERIDINE HYDROCHLORIDE 50 MG/ML
12.5 INJECTION INTRAMUSCULAR; INTRAVENOUS; SUBCUTANEOUS
Status: DISCONTINUED | OUTPATIENT
Start: 2018-04-13 | End: 2018-04-13 | Stop reason: HOSPADM

## 2018-04-13 RX ORDER — FENTANYL CITRATE 50 UG/ML
INJECTION, SOLUTION INTRAMUSCULAR; INTRAVENOUS PRN
Status: DISCONTINUED | OUTPATIENT
Start: 2018-04-13 | End: 2018-04-13

## 2018-04-13 RX ORDER — GLYCOPYRROLATE 0.2 MG/ML
INJECTION, SOLUTION INTRAMUSCULAR; INTRAVENOUS PRN
Status: DISCONTINUED | OUTPATIENT
Start: 2018-04-13 | End: 2018-04-13

## 2018-04-13 RX ORDER — KETAMINE HYDROCHLORIDE 50 MG/ML
INJECTION, SOLUTION INTRAMUSCULAR; INTRAVENOUS PRN
Status: DISCONTINUED | OUTPATIENT
Start: 2018-04-13 | End: 2018-04-13

## 2018-04-13 RX ORDER — SODIUM CHLORIDE, SODIUM LACTATE, POTASSIUM CHLORIDE, CALCIUM CHLORIDE 600; 310; 30; 20 MG/100ML; MG/100ML; MG/100ML; MG/100ML
INJECTION, SOLUTION INTRAVENOUS CONTINUOUS
Status: DISCONTINUED | OUTPATIENT
Start: 2018-04-13 | End: 2018-04-13 | Stop reason: HOSPADM

## 2018-04-13 RX ORDER — ONDANSETRON 2 MG/ML
INJECTION INTRAMUSCULAR; INTRAVENOUS PRN
Status: DISCONTINUED | OUTPATIENT
Start: 2018-04-13 | End: 2018-04-13

## 2018-04-13 RX ORDER — ACETAMINOPHEN 10 MG/ML
INJECTION, SOLUTION INTRAVENOUS PRN
Status: DISCONTINUED | OUTPATIENT
Start: 2018-04-13 | End: 2018-04-13

## 2018-04-13 RX ORDER — CEFAZOLIN SODIUM 1 G/50ML
1 INJECTION, SOLUTION INTRAVENOUS SEE ADMIN INSTRUCTIONS
Status: DISCONTINUED | OUTPATIENT
Start: 2018-04-13 | End: 2018-04-13 | Stop reason: HOSPADM

## 2018-04-13 RX ORDER — ONDANSETRON 2 MG/ML
4 INJECTION INTRAMUSCULAR; INTRAVENOUS EVERY 30 MIN PRN
Status: DISCONTINUED | OUTPATIENT
Start: 2018-04-13 | End: 2018-04-13 | Stop reason: HOSPADM

## 2018-04-13 RX ORDER — OXYCODONE HYDROCHLORIDE 5 MG/1
5 TABLET ORAL EVERY 4 HOURS PRN
Status: DISCONTINUED | OUTPATIENT
Start: 2018-04-13 | End: 2018-04-13 | Stop reason: HOSPADM

## 2018-04-13 RX ORDER — IBUPROFEN 200 MG
600 TABLET ORAL
Status: DISCONTINUED | OUTPATIENT
Start: 2018-04-13 | End: 2018-04-13 | Stop reason: HOSPADM

## 2018-04-13 RX ORDER — HYDRALAZINE HYDROCHLORIDE 20 MG/ML
2.5-5 INJECTION INTRAMUSCULAR; INTRAVENOUS EVERY 10 MIN PRN
Status: DISCONTINUED | OUTPATIENT
Start: 2018-04-13 | End: 2018-04-13 | Stop reason: HOSPADM

## 2018-04-13 RX ADMIN — DEXAMETHASONE SODIUM PHOSPHATE 4 MG: 4 INJECTION, SOLUTION INTRA-ARTICULAR; INTRALESIONAL; INTRAMUSCULAR; INTRAVENOUS; SOFT TISSUE at 07:44

## 2018-04-13 RX ADMIN — ACETAMINOPHEN 1000 MG: 10 INJECTION, SOLUTION INTRAVENOUS at 07:58

## 2018-04-13 RX ADMIN — LIDOCAINE HYDROCHLORIDE 100 MG: 20 INJECTION, SOLUTION INFILTRATION; PERINEURAL at 07:42

## 2018-04-13 RX ADMIN — FENTANYL CITRATE 50 MCG: 50 INJECTION, SOLUTION INTRAMUSCULAR; INTRAVENOUS at 07:37

## 2018-04-13 RX ADMIN — PROPOFOL 200 MCG/KG/MIN: 10 INJECTION, EMULSION INTRAVENOUS at 07:44

## 2018-04-13 RX ADMIN — OXYCODONE HYDROCHLORIDE AND ACETAMINOPHEN 1 TABLET: 5; 325 TABLET ORAL at 09:38

## 2018-04-13 RX ADMIN — BUPIVACAINE HYDROCHLORIDE 30 ML: 2.5 INJECTION, SOLUTION EPIDURAL; INFILTRATION; INTRACAUDAL; PERINEURAL at 08:30

## 2018-04-13 RX ADMIN — ROCURONIUM BROMIDE 30 MG: 10 INJECTION INTRAVENOUS at 07:42

## 2018-04-13 RX ADMIN — KETAMINE HYDROCHLORIDE 30 MG: 50 INJECTION, SOLUTION INTRAMUSCULAR; INTRAVENOUS at 07:38

## 2018-04-13 RX ADMIN — LIDOCAINE HYDROCHLORIDE 1 ML: 10 INJECTION, SOLUTION EPIDURAL; INFILTRATION; INTRACAUDAL; PERINEURAL at 07:22

## 2018-04-13 RX ADMIN — GLYCOPYRROLATE 0.6 MG: 0.2 INJECTION, SOLUTION INTRAMUSCULAR; INTRAVENOUS at 08:27

## 2018-04-13 RX ADMIN — SODIUM CHLORIDE, SODIUM LACTATE, POTASSIUM CHLORIDE, AND CALCIUM CHLORIDE: 600; 310; 30; 20 INJECTION, SOLUTION INTRAVENOUS at 08:27

## 2018-04-13 RX ADMIN — ROCURONIUM BROMIDE 20 MG: 10 INJECTION INTRAVENOUS at 08:08

## 2018-04-13 RX ADMIN — KETOROLAC TROMETHAMINE 30 MG: 30 INJECTION, SOLUTION INTRAMUSCULAR at 07:40

## 2018-04-13 RX ADMIN — NEOSTIGMINE METHYLSULFATE 3 MG: 1 INJECTION INTRAVENOUS at 08:27

## 2018-04-13 RX ADMIN — FENTANYL CITRATE 50 MCG: 50 INJECTION, SOLUTION INTRAMUSCULAR; INTRAVENOUS at 09:03

## 2018-04-13 RX ADMIN — FENTANYL CITRATE 50 MCG: 50 INJECTION, SOLUTION INTRAMUSCULAR; INTRAVENOUS at 08:08

## 2018-04-13 RX ADMIN — SODIUM CHLORIDE, SODIUM LACTATE, POTASSIUM CHLORIDE, AND CALCIUM CHLORIDE: 600; 310; 30; 20 INJECTION, SOLUTION INTRAVENOUS at 07:22

## 2018-04-13 RX ADMIN — ONDANSETRON 4 MG: 2 INJECTION INTRAMUSCULAR; INTRAVENOUS at 07:41

## 2018-04-13 RX ADMIN — MIDAZOLAM 2 MG: 1 INJECTION INTRAMUSCULAR; INTRAVENOUS at 07:37

## 2018-04-13 RX ADMIN — PROPOFOL 200 MG: 10 INJECTION, EMULSION INTRAVENOUS at 07:42

## 2018-04-13 NOTE — OP NOTE
Preoperative Diagnosis:  Epigastric hernia, initial and incarcerated, Desire for sterilization     Postoperative Diagnosis:  Epigastric hernia, initial and incarcerated, Desire for sterilization     Procedure: Epigastric ventral hernia repair with small Ventralex ST mesh, Bilateral salpingectomy     Surgeon: Bobby Jones    Assistant: Ike Krishnan MD    Anesthesia:  General    Estimated Blood Loss: 5 ml      INDICATIONS:  Please see the office consultation.  The patient was explained risks and benefits of theprocedure including but not limited to bleeding and possible infection, possible recurrence, possible mesh placement.  Possible damage to the bowel or surrounding tissues, possible chronic pain syndrome.  Also no heavylifting, pushing, pulling or strenuous activies more than 20 pounds for at least four weeks after surgery.  Patient appears to understand and wishes to proceed.  Written informed consent is obtained.    PROCEDURE:  The patient was brought to the operating room and placed in a supine position on the operating table.  After general anesthestic was given by anesthesia, the patient was positioned, prepped, and draped in the usual sterile fashion.  A timeout was taken to ensure the patient's name, site, and procedure to be performed.  All in the operating room were in agreement.  The operation then commenced.  Local anesthesia was injected and supra-umbilical curvilinear incision made.  Dissection was carried down through the subcutaneous tissues and the hernia sac dissected circumferentially down to its base.  The peritoneum was sharpley entered and trocar inserted under direct vision.  No bowel involvement in the hernia. A left lateral 5 mm port was placed under direct vision.  Bilateral salpingectomy was performed with Dr Krishnan. The right and left fallopian tubes were sent to pathology. The abdomen was desulfated and the peritoneum closed.    The defect was 1 cm in diameter.  A small Ventralex ST  mesh was obtained and passed through the defect into theperitoneum, both straps were pulled up and the mesh deployed circumferentially with forceps.     Hemostasis was assured and the mesh was lying flat and in good position.  The straps were secured to the fascia with 0-vicryl pop-off sutures.  The edges were closed with interrupted 0-vicryl sutures also, as well as the fascial edges and the mesh remained flat and in good position.  Hemostasis was assured and additional local was injected. The subcutaneous tissues were closed with a single interrupted stitch.   The skin edges were re-approximated with a running intracuticular 4-0 monocryl suture.  The incision was cleaned, steri-strips were applied and the incision was dressed.  The patient tolerated the procedure well, was returned tothe recovery area in a stable condition.  The sponge, needle, and instrument counts were correct at the end of the procedure.  There were no complications.

## 2018-04-13 NOTE — ANESTHESIA PREPROCEDURE EVALUATION
Anesthesia Evaluation     . Pt has had prior anesthetic. Type: General           ROS/MED HX    ENT/Pulmonary:  - neg pulmonary ROS     Neurologic:  - neg neurologic ROS     Cardiovascular:  - neg cardiovascular ROS       METS/Exercise Tolerance:     Hematologic:  - neg hematologic  ROS       Musculoskeletal:  - neg musculoskeletal ROS       GI/Hepatic:  - neg GI/hepatic ROS       Renal/Genitourinary:  - ROS Renal section negative       Endo:  - neg endo ROS       Psychiatric:  - neg psychiatric ROS       Infectious Disease:  - neg infectious disease ROS       Malignancy:      - no malignancy   Other:    (+) No chance of pregnancy C-spine cleared: N/A, no H/O Chronic Pain,no other significant disability                    Physical Exam  Normal systems: cardiovascular, pulmonary and dental    Airway   Mallampati: I  TM distance: >3 FB  Neck ROM: full    Dental     Cardiovascular       Pulmonary                     Anesthesia Plan      History & Physical Review      ASA Status:  2 .    NPO Status:  > 6 hours    Plan for General and ETT with Propofol and Intravenous induction. Maintenance will be TIVA.    PONV prophylaxis:  Ondansetron (or other 5HT-3) and Dexamethasone or Solumedrol       Postoperative Care  Postoperative pain management:  IV analgesics.      Consents                          .

## 2018-04-13 NOTE — H&P (VIEW-ONLY)
GENERAL SURGERY OFFICE CONSULTATION NOTE    Patient Name: Jailene New  Address: [address] [place] [id number]  Age:37 year old  Sex: female     Primary Care Physician: Kayla Dorantes    I wasrequested to see this patient in consultation by Kayla Dorantes for evaluation of abdominal pain/bulge. A copy of my findings and recommendations will be sent to Kayla Dorantes.    HPI:   The patient is 37 year old female with umbilicalbulge and pain. The patient first noted this a few weeks ago. The bulge has been getting bigger and more uncomfortable. No nausea or vomiting. No problems with new diarrhea or constipation. No skin redness or rash. No previous umbilical hernia surgery. Previous  X 2.  Pt would like to pursue tubal ligation at time of hernia repair.       REVIEW OF SYSTEMS  GENERAL: No fevers or chills. Denies fatigue, recent weight loss.  HEENT: No sinus drainage. No changes with vision or hearing. No difficulty swallowing.   LYMPHATICS:  No swollen nodes inaxilla, neck or groin.  CARDIOVASCULAR: Denies chest pain, palpitations and dyspnea on exertion.  PULMONARY: No shortness of breath or cough. No increase in sputum production.  GI: Denies melena, bright red blood in stools. No hematemesis. No constipation or diarrhea.  : No dysuria or hematuria.  SKIN: No recent rashes or ulcers.   HEMATOLOGY:  No history of easy bruising or bleeding.  ENDOCRINE:  No history of diabetes orthyroid problems.  NEUROLOGY:  No history of seizures or headaches. No motor or sensory changes.     PAST MEDICAL HISTORY    Past Medical History:   Diagnosis Date     Attention-deficit hyperactivity disorder     teen onset/meds thru [facility]     Personal history of other medical treatment (CODE)     G2Para 2-0-0-2       PAST SURGICAL HISTORY    Past Surgical History:   Procedure Laterality Date     BUNIONECTOMY      3-,left      SECTION      ,breech presentation      "OTHER SURGICAL HISTORY      06/08,563836,OTHER       CURRENT MEDS  Current Outpatient Prescriptions   Medication     amphetamine-dextroamphetamine (ADDERALL XR) 20 MG per 24 hr capsule     amphetamine-dextroamphetamine (ADDERALL) 10 MG per tablet     ibuprofen (ADVIL/MOTRIN) 600 MG tablet     norgestim-eth estrad triphasic (TRINESSA LO) 0.18/0.215/0.25 MG-25 MCG per tablet     valACYclovir (VALTREX) 500 MG tablet     No current facility-administered medications for this visit.      ALLERGIES/SENSITIVITIES  No Known Allergies  FAMILY HISTORY    Family History   Problem Relation Age of Onset     Other - See Comments Mother      ADD     Other - See Comments Sister      ADD     Breast Cancer Maternal Grandmother      Cancer-breast,and lung cancer       SOCIALHISTORY    Social History     Social History     Marital status:      Spouse name: N/A     Number of children: N/A     Years of education: 16     Occupational History     Not on file.     Social History Main Topics     Smoking status: Never Smoker     Smokeless tobacco: Never Used     Alcohol use Yes      Comment: Alcoholic Drinks/day: occasionally     Drug use: Not on file      Comment: Drug use: No     Sexual activity: Yes     Partners: Male     Other Topics Concern     Not on file     Social History Narrative    Elementary education degree, in Masters program [employer] 2015.  [employer] teacher at [employer]    Children:  Yoselyn Griffith       2011       PHYSICAL EXAM  /90 (BP Location: Right arm, Patient Position: Sitting, Cuff Size: Adult Regular)  Ht 5' 7.5\" (1.715 m)  Wt 130 lb (59 kg)  LMP 03/13/2018 (Approximate)  Breastfeeding? No  BMI 20.06 kg/m2    Body mass index is 20.06 kg/(m^2).      GENERAL: Healthy appearing patient in no acute distress. Pleasant and cooperative with exam and interview.   HEENT: Head-normocephalic. Eyes-no scleralicterus, pupils equal, round, and reactive to light. Nose- no nasal drainage. No lesions. Mouth-oral mucosa " pink and moist, no lesions.  NECK: Supple. No thyroid nodules. Trachea midline.  LYMPHATICS:  No cervical,axillary or supraclavicular adenopathy.  CV: Regular rate and rhythm, no murmurs. No peripheral edema.  LUNGS:  No respiratory distress. Clear bilaterally to auscultation.  ABDOMEN: Non distended. Bowel soundsactive. Soft, non-tender, no hepatosplenomegaly. Epigastric hernia noted,  Mild tenderness, reducible. No peritoneal signs.  SKIN: Pink, warm and dry. No jaundice. No rash.  NEURO:  Cranial nerves II-XII grossly intact.Alert and oriented.  PSYCH: Appropriate mood and affect.    IMAGING/LAB      CONSULTATION ASSESSMENT AND PLAN/RECOMMENDATIONS:   37 year old female with epigastric ventral hernia and desire for sterilization.      I discussed with the patient the pathophysiology of ventral hernias. I explained the risks, benefits and alternatives to repair of epigastric hernia with mesh. We specifically discussed the risks of infection, bleeding, injury to intra-abdominal organs, mesh complications and hernia recurrence. We discussed post operative limitations and expected recovery time. The patient's questions were answered and the patient wishes to proceed with repair. Informed consent paperwork was completed. This will be scheduled at a time that is convenient for the patient. The patient will call with questions or concerns prior to the procedure.    Bobby Jones MD on 4/5/2018 at 4:25 PM

## 2018-04-13 NOTE — OR NURSING
[place] [facility] DISCHARGE NOTE    Patient discharged to home at 11:00 AM via wheel chair. Accompanied by mother and staff. Discharge instructions reviewed with patient and mother, opportunity offered to ask questions. Prescriptions filled and sent with patient upon discharge. All belongings sent with patient.No further questions or concerns    Sandra Jimenez

## 2018-04-13 NOTE — ANESTHESIA CARE TRANSFER NOTE
Patient: Jailene New    Procedure(s):  Epigastric Ventral Hernia Repair & Laparoscopic Tubal Ligation - Wound Class: I-Clean   - Wound Class: I-Clean    Diagnosis: epigastric ventral hernia, desire for sterilization  Diagnosis Additional Information: No value filed.    Anesthesia Type:   General, ETT     Note:  Airway :Oral Airway and Face Mask  Patient transferred to:PACU  Handoff Report: Identifed the Patient, Identified the Reponsible Provider, Reviewed the pertinent medical history, Discussed the surgical course, Reviewed Intra-OP anesthesia mangement and issues during anesthesia, Set expectations for post-procedure period and Allowed opportunity for questions and acknowledgement of understanding      Vitals: (Last set prior to Anesthesia Care Transfer)              Electronically Signed By: SAMIR Rodarte CRNA  April 13, 2018  8:45 AM

## 2018-04-13 NOTE — DISCHARGE INSTRUCTIONS
[facility]   Adult Discharge Orders & Instructions     For 24 hours after surgery    1. Get plenty of rest.  A responsible adult must stay with you for at least 24 hours after you leave the hospital.   2. Do not drive or use heavy equipment.  If you have weakness or tingling, don't drive or use heavy equipment until this feeling goes away.  3. Do not drink alcohol.  4. Avoid strenuous or risky activities.  Ask for help when climbing stairs.   5. You may feel lightheaded.  IF so, sit for a few minutes before standing.  Have someone help you get up.   6. If you have nausea (feel sick to your stomach): Drink only clear liquids such as apple juice, ginger ale, broth or 7-Up.  Rest may also help.  Be sure to drink enough fluids.  Move to a regular diet as you feel able.  7. You may have a slight fever. Call the doctor if your fever is over 101 F (38.3 C) (taken under the tongue) or lasts longer than 24 hours.  8. You may have a dry mouth, a sore throat, muscle aches or trouble sleeping.  These should go away after 24 hours.  9. Do not make important or legal decisions.   Call your doctor for any of the followin.  Signs of infection (fever, growing tenderness at the surgery site, a large amount of drainage or bleeding, severe pain, foul-smelling drainage, redness, swelling).    2. It has been over 8 to 10 hours since surgery and you are still not able to urinate (pass water).    3.  Headache for over 24 hours.    4.  Numbness, tingling or weakness the day after surgery (if you had spinal anesthesia).  To contact a doctor, call    [phone]___________________________

## 2018-04-13 NOTE — INTERVAL H&P NOTE
I saw and examined Jailene New.  I have reviewed the history and physical and find no changes to the patient's medical status or condition with the exceptions noted below.     Bobby Jones   7:27 AM 4/13/2018

## 2018-04-13 NOTE — OP NOTE
[facility] Gynecology Operative Note    Pre-operative diagnosis: Ventral abdominal hernia, laparoscopic bilateral salpingectomy   Post-operative diagnosis: Same   Procedure: Laparoscopic bilateral salpingectomy  Ventral abdominal herniorrhaphy (Dr. Jones)   Surgeon: MD Dr. Bobby Bartlett   Assistant(s): None   Anesthesia: General Endotracheal Anesthesia   Estimated blood loss: less than 50ml   Total IV fluids: (See anesthesia record)   Blood transfusion: No transfusion was given during surgery   Total urine output: (See anesthesia record)   Drains: None   Specimens: Bilateral fallopian tubes   Findings: Adhesions of cecum to right abdominal sidewall.  Ventral anterior epigastric hernia   Complications: None   Condition: Stable   Procedure details:  After consent was obtained the patient was taken to the operative suite and placed under general anesthesia.  Appropriate prep and drape was performed sterilely and a timeout protocol observed.  Dr. Jones proceeded by making a 5 mm transverse incision over her palpable  ventral hernia.  The abdominal cavity was entered sharply.  5 mm trocar was inserted into the intra-abdominal cavity and CO2 gas was used insufflate her abdomen.  2 additional 5 mm ports were placed after injection of the subcutaneous space with half percent Marcaine with dilute epinephrine.  1 of the ports was in the left lower quadrant and one adjacent to the umbilical port.  The abdomen was explored and adhesions noted from the cecum to the abdominal sidewall on the right.  The fallopian tubes were bilaterally adherent to the pelvic sidewall.  The ovaries and uterus appeared normal.  The cul-de-sac was normal.  Liver gallbladder stomach and visible small and large bowel appear normal.  A 5 mm graspers placed through the left lower quadrant port and the LigaSure through the periumbilical port.  The right tube was grasped and deviated to the midline and the LigaSure used  to skeletonize the mesosalpinx and cauterize the feeding vessels.  After the tube was skeletonized the base of the fallopian tube at the cornua on the right was crossclamped cauterized and excised.  No bleeding was noted.  The free tube was placed in the anterior cul-de-sac and we proceeded with the left side.  The left tube was grasped and deviated to the midline and same procedure carried out.  Mesosalpinx was isolated and cauterized and divided all the way down to the left uterine cornua.  The base of the tube was then crossclamped, cauterized and divided.  Both tubes were grasped and removed through the left lower quadrant port.  The left ureter was adherent to the superior portion of the broad ligament on the left but was easily visible and not injured during the case.  At that point the patient was in stable condition with no bleeding occurring and the case was turned over for completion of the herniorrhaphy for Dr. Jones.  Estimated blood loss was less than 20 cc and pathology included bilateral fallopian tubes.    Ike Krishnan MD FACOG  8:25 AM 4/13/2018

## 2018-04-13 NOTE — CONSULTS
Patient requesting tubal sterilization combined with her herniorrhaphy. Discussed risks benefits and alternatives of salpingectomy. Will proceed with Dr. Jones's herniorrhaphy to follow.    Ike Krishnan MD FACOG  7:34 AM 4/13/2018

## 2018-04-13 NOTE — ANESTHESIA POSTPROCEDURE EVALUATION
Patient: Jailene New    Procedure(s):  Epigastric Ventral Hernia Repair & Laparoscopic Tubal Ligation - Wound Class: I-Clean   - Wound Class: I-Clean    Diagnosis:epigastric ventral hernia, desire for sterilization  Diagnosis Additional Information: No value filed.    Anesthesia Type:  General, ETT    Note:  Anesthesia Post Evaluation    Patient location during evaluation: PACU  Patient participation: Able to fully participate in evaluation  Level of consciousness: awake and alert  Pain management: adequate  Airway patency: patent  Cardiovascular status: acceptable  Respiratory status: acceptable  Hydration status: acceptable  PONV: none     Anesthetic complications: None          Last vitals:  Vitals:    04/13/18 0905 04/13/18 0910 04/13/18 0915   BP: (!) 144/95 153/85    Resp: 17 13 19   Temp:  98.2  F (36.8  C)    SpO2: 99% 98% 98%         Electronically Signed By: Reynaldo Myers DO  April 13, 2018  9:26 AM

## 2018-04-13 NOTE — IP AVS SNAPSHOT
[facility]    [address]    [place] [address]    Phone:  [phone]    Fax:  [phone]                                       After Visit Summary   4/13/2018    Jailene New    MRN: [id number]           After Visit Summary Signature Page     I have received my discharge instructions, and my questions have been answered. I have discussed any challenges I see with this plan with the nurse or doctor.    ..........................................................................................................................................  Patient/Patient Representative Signature      ..........................................................................................................................................  Patient Representative Print Name and Relationship to Patient    ..................................................               ................................................  Date                                            Time    ..........................................................................................................................................  Reviewed by Signature/Title    ...................................................              ..............................................  Date                                                            Time

## 2018-04-16 ENCOUNTER — TELEPHONE (OUTPATIENT)
Dept: SURGERY | Facility: OTHER | Age: 38
End: 2018-04-16

## 2018-04-16 NOTE — TELEPHONE ENCOUNTER
Fax addressed to Dr. Jones received from #[phone].  Patient is requesting a return to work letter after a ventral herniorrhaphy on 4-13-18.    Attempted to call patient to let her know that Dr. Jones is not available until tomorrow. No answer at number listed in chart, no voicemail set up. Mariana Isabel RN on 4/16/2018 at 9:16 AM

## 2018-04-16 NOTE — LETTER
[facility]  [address]  [place] [address]  Phone: [phone]  Fax: [phone]    April 16, 2018        Jailene New  [address] [address]          To whom it may concern:    RE: Jailene New    Patient may return to work 4/16/2018  with the following:  Light duty-unable to lift more than 20 pounds X 4 weeks.     Please contact me for questions or concerns.      Sincerely,        Bobby Jones MD

## 2018-04-26 ENCOUNTER — OFFICE VISIT (OUTPATIENT)
Dept: SURGERY | Facility: OTHER | Age: 38
End: 2018-04-26
Attending: SURGERY
Payer: COMMERCIAL

## 2018-04-26 VITALS — BODY MASS INDEX: 20.18 KG/M2 | DIASTOLIC BLOOD PRESSURE: 94 MMHG | WEIGHT: 130.8 LBS | SYSTOLIC BLOOD PRESSURE: 148 MMHG

## 2018-04-26 DIAGNOSIS — Z98.890 S/P REPAIR OF VENTRAL HERNIA: Primary | ICD-10-CM

## 2018-04-26 DIAGNOSIS — Z87.19 S/P REPAIR OF VENTRAL HERNIA: Primary | ICD-10-CM

## 2018-04-26 PROCEDURE — 99024 POSTOP FOLLOW-UP VISIT: CPT | Mod: 25 | Performed by: SURGERY

## 2018-04-26 NOTE — MR AVS SNAPSHOT
"              After Visit Summary   2018    Jailene New    MRN: [id number]           Patient Information     Date Of Birth          1980        Visit Information        Provider Department      2018 3:40 PM Bobby Jones MD [facility]        Today's Diagnoses     S/P repair of ventral hernia    -  1       Follow-ups after your visit        Who to contact     If you have questions or need follow up information about today's clinic visit or your schedule please contact [facility] directly at [phone].  Normal or non-critical lab and imaging results will be communicated to you by SayHello LLChart, letter or phone within 4 business days after the clinic has received the results. If you do not hear from us within 7 days, please contact the clinic through Telnexust or phone. If you have a critical or abnormal lab result, we will notify you by phone as soon as possible.  Submit refill requests through RECCY or call your pharmacy and they will forward the refill request to us. Please allow 3 business days for your refill to be completed.          Additional Information About Your Visit        MyChart Information     RECCY lets you send messages to your doctor, view your test results, renew your prescriptions, schedule appointments and more. To sign up, go to [email or web address] . Click on \"Log in\" on the left side of the screen, which will take you to the Welcome page. Then click on \"Sign up Now\" on the right side of the page.     You will be asked to enter the access code listed below, as well as some personal information. Please follow the directions to create your username and password.     Your access code is: [email or web address]  Expires: 2018  4:30 PM     Your access code will  in 90 days. If you need help or a new code, please call your [facility] or [phone].        Care EveryWhere ID     This is your Care EveryWhere ID. This could be " used by other organizations to access your [facility] medical records  [id number]        Your Vitals Were     Breastfeeding? BMI (Body Mass Index)                No 20.18 kg/m2           Blood Pressure from Last 3 Encounters:   04/26/18 (!) 148/94   04/13/18 (!) 143/98   04/05/18 138/90    Weight from Last 3 Encounters:   04/26/18 59.3 kg (130 lb 12.8 oz)   04/13/18 58.8 kg (129 lb 9.6 oz)   04/05/18 59 kg (130 lb)              Today, you had the following     No orders found for display         Today's Medication Changes          These changes are accurate as of 4/26/18 11:59 PM.  If you have any questions, ask your nurse or doctor.               Stop taking these medicines if you haven't already. Please contact your care team if you have questions.     oxyCODONE-acetaminophen 5-325 MG per tablet   Commonly known as:  PERCOCET   Stopped by:  Bobby Jones MD                    Primary Care Provider Office Phone # Fax #    Kayla JOHNSTON Meng Olvera [phone] [address] [facility] [address]        Equal Access to Services     [facility]: Hadii kira mckeon hadjoaoo Sote, waaxda luqadaha, qaybta kaalmada cherida, michelle obregon. So [facility] [phone].    ATENCIÓN: Si habla español, tiene a torre disposición servicios gratuitos de asistencia lingüística. [facility] [phone].    We comply with applicable federal civil rights laws and [place] laws. We do not discriminate on the basis of race, color, national origin, age, disability, sex, sexual orientation, or gender identity.            Thank you!     Thank you for choosing [facility] AND [facility]  for your care. Our goal is always to provide you with excellent care. Hearing back from our patients is one way we can continue to improve our services. Please take a few minutes to complete the written survey that you may receive in the mail after your visit with us. Thank you!             Your  Updated Medication List - Protect others around you: Learn how to safely use, store and throw away your medicines at www.disposemymeds.org.          This list is accurate as of 4/26/18 11:59 PM.  Always use your most recent med list.                   Brand Name Dispense Instructions for use Diagnosis    * ADDERALL PO      Take 30 mg by mouth daily In am        * ADDERALL XR 20 MG per 24 hr capsule   Generic drug:  amphetamine-dextroamphetamine      TAKE 1/2  CAPSULE BY MOUTH EVERY DAY  IN THE MORNING AND TAKE 1 CAPSULE BY MOUTH EVERY DAY AT 2 pm(FILL AFTER 11/1/2016)        ibuprofen 600 MG tablet    ADVIL/MOTRIN     Take 600 mg by mouth every 6 hours as needed        valACYclovir 500 MG tablet    VALTREX     Take 500 mg by mouth As needed for cold sores        * Notice:  This list has 2 medication(s) that are the same as other medications prescribed for you. Read the directions carefully, and ask your doctor or other care provider to review them with you.

## 2018-04-27 NOTE — PROGRESS NOTES
Patient presents for post surgical visit after ventral hernia repair 2 weeks ago. Patient has done well. No problems with incisions.    BP (!) 148/94 (BP Location: Right arm, Patient Position: Sitting, Cuff Size: Adult Regular)  Wt 59.3 kg (130 lb 12.8 oz)  Breastfeeding? No  BMI 20.18 kg/m2    General: NAD, pleasant and cooperative with exam and interview.  Abdomen: healing incisions. No sign of infection. No pain with palpation.  There is a ridge of scar tissue under the incision.   Psychiatry: awake, alert and oriented. Appropriate affect.    Assessment/Plan:  Discussed surgery. Patient can return to normal activities in 2 weeks. Patient will call with questions or concerns.    Bobby Jones MD on 4/27/2018 at 7:17 AM

## 2018-05-15 ENCOUNTER — HEALTH MAINTENANCE LETTER (OUTPATIENT)
Age: 38
End: 2018-05-15

## 2018-06-05 ENCOUNTER — TELEPHONE (OUTPATIENT)
Dept: FAMILY MEDICINE | Facility: OTHER | Age: 38
End: 2018-06-05

## 2018-06-05 NOTE — TELEPHONE ENCOUNTER
Patient notified of below. She was transferred to appointment line.    Marcelle Arevalo LPN.................. 6/5/2018 9:07 AM

## 2018-06-05 NOTE — TELEPHONE ENCOUNTER
Patient is wondering if she can try spironolactone for acne? Last office visit with Kayla Maradiaga MD was 3/11/15. She was notified that she may need to be seen, as it has been 3 years since her last OV with Kayla Maradiaga MD.    Marcelle Arevalo LPN.................. 6/5/2018 8:42 AM

## 2018-06-06 ENCOUNTER — OFFICE VISIT (OUTPATIENT)
Dept: FAMILY MEDICINE | Facility: OTHER | Age: 38
End: 2018-06-06
Attending: PHYSICIAN ASSISTANT
Payer: COMMERCIAL

## 2018-06-06 VITALS
HEIGHT: 68 IN | BODY MASS INDEX: 19.52 KG/M2 | WEIGHT: 128.8 LBS | SYSTOLIC BLOOD PRESSURE: 122 MMHG | HEART RATE: 68 BPM | DIASTOLIC BLOOD PRESSURE: 80 MMHG

## 2018-06-06 DIAGNOSIS — L70.0 ACNE VULGARIS: Primary | ICD-10-CM

## 2018-06-06 PROCEDURE — 99213 OFFICE O/P EST LOW 20 MIN: CPT | Mod: 24 | Performed by: PHYSICIAN ASSISTANT

## 2018-06-06 RX ORDER — SPIRONOLACTONE 25 MG/1
25 TABLET ORAL 2 TIMES DAILY
Qty: 180 TABLET | Refills: 3 | Status: SHIPPED | OUTPATIENT
Start: 2018-06-06 | End: 2019-08-28

## 2018-06-06 RX ORDER — DEXTROAMPHETAMINE SACCHARATE, AMPHETAMINE ASPARTATE, DEXTROAMPHETAMINE SULFATE AND AMPHETAMINE SULFATE 5; 5; 5; 5 MG/1; MG/1; MG/1; MG/1
20 TABLET ORAL DAILY
Qty: 60 TABLET | Refills: 0 | COMMUNITY
Start: 2018-06-06 | End: 2019-11-18

## 2018-06-06 NOTE — NURSING NOTE
Patient presents to clinic to discuss acne and medication.  Fara Multani LPN ...... 6/6/2018 8:13 AM

## 2018-06-06 NOTE — MR AVS SNAPSHOT
After Visit Summary   6/6/2018    Jailene New    MRN: [id number]           Patient Information     Date Of Birth          1980        Visit Information        Provider Department      6/6/2018 8:00 AM Ariana Nogueira PA-C [facility]        Today's Diagnoses     Acne vulgaris    -  1      Care Instructions    Acne:   Start taking spironolactone 25 mg daily. Increase to twice daily in 1 month if needed.   Return to clinic with change/worsening of symptoms.       Acne  Acne is an inflammatory condition of the skin. During adolescence (especially in boys) there is an increase in production of skin oils on the face, neck, chest, upper back, and upper arms. These oils can block hair follicles and allow an overgrowth of normal bacteria. This results in acne.  Mild acne causes whiteheads, blackheads, and pimples. More severe acne causes cysts and scarring. Acne usually clears up by your mid-20 s.  These factors can make acne worse:    Oil-based cosmetics    Heavy sweating    Frequent or hard scrubbing of the skin can irritate the acne lesions    Skin rubbing against helmets, shoulder pads, turtlenecks, and bra straps    Certain types of birth control pills  Home care  Here are some tips to help care for acne:  1. Treatments may include topical products (creams, lotions, or gels), oral antibiotics, and other medicines.  2. Follow the treatment plan advised by your healthcare provider. It usually takes 2 to 3 months to see a result.  3. Switching from oil-based to water-based makeup may improve acne in girls.  Follow-up care  Follow up with your healthcare provider, or as advised. For more information:    American Academy of Dermatology  www.aad.org  When to seek medical advice  Call your healthcare provider right away if you have acne cysts that get larger or more painful.  Date Last Reviewed: 6/1/2016 2000-2017 [employer]. [address] [facility],  "[place] [address]. All rights reserved. This information is not intended as a substitute for professional medical care. Always follow your healthcare professional's instructions.                Follow-ups after your visit        Who to contact     If you have questions or need follow up information about today's clinic visit or your schedule please contact [facility] AND \[facility]\"" directly at [phone].  Normal or non-critical lab and imaging results will be communicated to you by Postinihart, letter or phone within 4 business days after the clinic has received the results. If you do not hear from us within 7 days, please contact the clinic through [employer] or phone. If you have a critical or abnormal lab result, we will notify you by phone as soon as possible.  Submit refill requests through MyLikes or call your pharmacy and they will forward the refill request to us. Please allow 3 business days for your refill to be completed.          Additional Information About Your Visit        PostiniharTruVitals Information     My[employer]ikes gives you secure access to your electronic health record. If you see a primary care provider, you can also send messages to your care team and make appointments. If you have questions, please call your primary care clinic.  If you do not have a primary care provider, please call [phone] and they will assist you.        Care EveryWhere ID     This is your Care EveryWhere ID. This could be used by other organizations to access your [place] medical records  [id number]        Your Vitals Were     Pulse Height Last Period BMI (Body Mass Index)          68 5' 7.5\" (1.715 m) 05/30/2018 19.88 kg/m2         Blood Pressure from Last 3 Encounters:   06/06/18 122/80   04/26/18 (!) 148/94   04/13/18 (!) 143/98    Weight from Last 3 Encounters:   06/06/18 128 lb 12.8 oz (58.4 kg)   04/26/18 130 lb 12.8 oz (59.3 kg)   04/13/18 129 lb 9.6 oz (58.8 kg)              Today, you had the following     No orders " found for display         Today's Medication Changes          These changes are accurate as of 6/6/18  8:34 AM.  If you have any questions, ask your nurse or doctor.               Start taking these medicines.        Dose/Directions    spironolactone 25 MG tablet   Commonly known as:  ALDACTONE   Used for:  Acne vulgaris   Started by:  Ariana Nogueira PA-C        Dose:  25 mg   Take 1 tablet (25 mg) by mouth 2 times daily   Quantity:  180 tablet   Refills:  3         These medicines have changed or have updated prescriptions.        Dose/Directions    * ADDERALL PO   This may have changed:  Another medication with the same name was removed. Continue taking this medication, and follow the directions you see here.   Changed by:  Ariana Nogueira PA-C        Dose:  30 mg   Take 30 mg by mouth daily In am   Refills:  0       * ADDERALL 20 MG per tablet   This may have changed:  Another medication with the same name was removed. Continue taking this medication, and follow the directions you see here.   Generic drug:  amphetamine-dextroamphetamine   Changed by:  Ariana Nogueira PA-C        Dose:  20 mg   Take 1 tablet (20 mg) by mouth daily   Quantity:  60 tablet   Refills:  0       * Notice:  This list has 2 medication(s) that are the same as other medications prescribed for you. Read the directions carefully, and ask your doctor or other care provider to review them with you.         Where to get your medicines      These medications were sent to [id number] - [place], [place] - [address]  [address], [facility] [address]     Phone:  [phone]     spironolactone 25 MG tablet                Primary Care Provider Office Phone # Fax #    Kayla Olvera [phone] [address] [facility] [address]        Equal Access to Services     ARDEN HDZ AH: Bindu Khan, sandra mathur, anastacia sandoval  michelle barreracharly wilsonaan ah. So [facility] [phone].    ATENCIÓN: Si clemencia carter, tiene a torre disposición servicios gratuitos de asistencia lingüística. Tomas al [phone].    We comply with applicable federal civil rights laws and [place] laws. We do not discriminate on the basis of race, color, national origin, age, disability, sex, sexual orientation, or gender identity.            Thank you!     Thank you for choosing [facility] AND [facility]  for your care. Our goal is always to provide you with excellent care. Hearing back from our patients is one way we can continue to improve our services. Please take a few minutes to complete the written survey that you may receive in the mail after your visit with us. Thank you!             Your Updated Medication List - Protect others around you: Learn how to safely use, store and throw away your medicines at www.disposemymeds.org.          This list is accurate as of 6/6/18  8:34 AM.  Always use your most recent med list.                   Brand Name Dispense Instructions for use Diagnosis    * ADDERALL PO      Take 30 mg by mouth daily In am        * ADDERALL 20 MG per tablet   Generic drug:  amphetamine-dextroamphetamine     60 tablet    Take 1 tablet (20 mg) by mouth daily        ibuprofen 600 MG tablet    ADVIL/MOTRIN     Take 600 mg by mouth every 6 hours as needed        spironolactone 25 MG tablet    ALDACTONE    180 tablet    Take 1 tablet (25 mg) by mouth 2 times daily    Acne vulgaris       TRINESSA LO 0.18/0.215/0.25 MG-25 MCG per tablet   Generic drug:  norgestim-eth estrad triphasic      TK 1 T PO ONE TIME A DAY        valACYclovir 500 MG tablet    VALTREX     Take 500 mg by mouth As needed for cold sores        * Notice:  This list has 2 medication(s) that are the same as other medications prescribed for you. Read the directions carefully, and ask your doctor or other care provider to review them with you.

## 2018-06-06 NOTE — PROGRESS NOTES
Nursing Notes:   Clary Multani LPN  2018  8:20 AM  Signed  Patient presents to clinic to discuss acne and medication.  Fara Nerissa NORMAN ...... 2018 8:13 AM        HPI:    Jailene New is a 37 year old female who presents for acne.  Wondering about spironolactone for acne.  Cystic acne after son was born about 8-9 years ago. Doing skin care. Tender on jaw.  OCPs did not help.  Hormonal.  Acne flares up and down.  Present on forehead, cheeks.  Some on chest and back.  Has cystic acne.  Family history of acne.    Past Medical History:   Diagnosis Date     Attention-deficit hyperactivity disorder     teen onset/meds thru [facility]     Personal history of other medical treatment (CODE)     G2Para 2-0-0-2       Past Surgical History:   Procedure Laterality Date     BUNIONECTOMY      3-,left      SECTION      ,breech presentation     chin surgery  2008     HERNIORRHAPHY VENTRAL N/A 2018    Procedure: HERNIORRHAPHY VENTRAL;  Epigastric Ventral Hernia Repair & Laparoscopic Tubal Ligation;  Surgeon: Bobby Jones MD;  Location: [facility] OR     LAPAROSCOPIC TUBAL LIGATION Bilateral 2018    Procedure: LAPAROSCOPIC TUBAL LIGATION;;  Surgeon: Ike Krishnan MD;  Location: [facility] OR     wisdom teeth         Family History   Problem Relation Age of Onset     Breast Cancer Maternal Grandmother      Cancer-breast,and lung cancer     Other - See Comments Mother      ADD     Other - See Comments Sister      ADD       Social History     Social History     Marital status:      Spouse name: N/A     Number of children: N/A     Years of education: 16     Occupational History     Not on file.     Social History Main Topics     Smoking status: Never Smoker     Smokeless tobacco: Never Used     Alcohol use Yes      Comment: Alcoholic Drinks/day: occasionally     Drug use: Not on file      Comment: Drug use: No     Sexual activity: Yes     Partners: Male     Other Topics Concern     Not  "on file     Social History Narrative    Elementary education degree, in Masters program [employer] 2015.  Kdgn teacher at [employer]    Children:  Yoselyn Griffith       2011       Current Outpatient Prescriptions   Medication Sig Dispense Refill     Amphetamine-Dextroamphetamine (ADDERALL PO) Take 30 mg by mouth daily In am       amphetamine-dextroamphetamine (ADDERALL) 20 MG per tablet Take 1 tablet (20 mg) by mouth daily 60 tablet 0     ibuprofen (ADVIL/MOTRIN) 600 MG tablet Take 600 mg by mouth every 6 hours as needed       spironolactone (ALDACTONE) 25 MG tablet Take 1 tablet (25 mg) by mouth 2 times daily 180 tablet 3     TRINESSA LO 0.18/0.215/0.25 MG-25 MCG per tablet TK 1 T PO ONE TIME A DAY  2     valACYclovir (VALTREX) 500 MG tablet Take 500 mg by mouth As needed for cold sores         No Known Allergies    REVIEW OF SYSTEMS:  Refer to HPI.    EXAM:   Vitals:    /80 (BP Location: Right arm, Patient Position: Sitting, Cuff Size: Adult Regular)  Pulse 68  Ht 5' 7.5\" (1.715 m)  Wt 128 lb 12.8 oz (58.4 kg)  LMP 05/30/2018  BMI 19.88 kg/m2    General Appearance: Pleasant, alert, appropriate appearance for age. No acute distress  Skin: Many erythematous papules and several pustules appreciated on the forehead, cheeks.  A few scattered on the chest and back.  Psychiatric Exam: Alert and oriented - appropriate affect.    PHQ Depression Screen  PHQ-9 SCORE 5/20/2015   Total Score 0       ASSESSMENT AND PLAN:    1. Acne vulgaris        Acne:   Start taking spironolactone 25 mg daily. Increase to twice daily in 1 month if needed.   Call in the next 2-3 months to let us know how she is doing with medication and if an increase is necessary.  Return to clinic with change/worsening of symptoms.       Patient Instructions   Acne:   Start taking spironolactone 25 mg daily. Increase to twice daily in 1 month if needed.   Return to clinic with change/worsening of symptoms.       Acne  Acne is an inflammatory condition " of the skin. During adolescence (especially in boys) there is an increase in production of skin oils on the face, neck, chest, upper back, and upper arms. These oils can block hair follicles and allow an overgrowth of normal bacteria. This results in acne.  Mild acne causes whiteheads, blackheads, and pimples. More severe acne causes cysts and scarring. Acne usually clears up by your mid-20 s.  These factors can make acne worse:    Oil-based cosmetics    Heavy sweating    Frequent or hard scrubbing of the skin can irritate the acne lesions    Skin rubbing against helmets, shoulder pads, turtlenecks, and bra straps    Certain types of birth control pills  Home care  Here are some tips to help care for acne:  1. Treatments may include topical products (creams, lotions, or gels), oral antibiotics, and other medicines.  2. Follow the treatment plan advised by your healthcare provider. It usually takes 2 to 3 months to see a result.  3. Switching from oil-based to water-based makeup may improve acne in girls.  Follow-up care  Follow up with your healthcare provider, or as advised. For more information:    American Academy of Dermatology  www.aad.org  When to seek medical advice  Call your healthcare provider right away if you have acne cysts that get larger or more painful.  Date Last Reviewed: 6/1/2016 2000-2017 The [employer]. [address] [place] [address], [place] [address]. All rights reserved. This information is not intended as a substitute for professional medical care. Always follow your healthcare professional's instructions.           Ariana Nogueira PA-C..................6/6/2018 8:20 AM

## 2018-06-06 NOTE — PATIENT INSTRUCTIONS
Acne:   Start taking spironolactone 25 mg daily. Increase to twice daily in 1 month if needed.   Return to clinic with change/worsening of symptoms.       Acne  Acne is an inflammatory condition of the skin. During adolescence (especially in boys) there is an increase in production of skin oils on the face, neck, chest, upper back, and upper arms. These oils can block hair follicles and allow an overgrowth of normal bacteria. This results in acne.  Mild acne causes whiteheads, blackheads, and pimples. More severe acne causes cysts and scarring. Acne usually clears up by your mid-20 s.  These factors can make acne worse:    Oil-based cosmetics    Heavy sweating    Frequent or hard scrubbing of the skin can irritate the acne lesions    Skin rubbing against helmets, shoulder pads, turtlenecks, and bra straps    Certain types of birth control pills  Home care  Here are some tips to help care for acne:  1. Treatments may include topical products (creams, lotions, or gels), oral antibiotics, and other medicines.  2. Follow the treatment plan advised by your healthcare provider. It usually takes 2 to 3 months to see a result.  3. Switching from oil-based to water-based makeup may improve acne in girls.  Follow-up care  Follow up with your healthcare provider, or as advised. For more information:    American Academy of Dermatology  www.aad.org  When to seek medical advice  Call your healthcare provider right away if you have acne cysts that get larger or more painful.  Date Last Reviewed: 6/1/2016 2000-2017 The [employer]. [address] [place] [address], [place] [address]. All rights reserved. This information is not intended as a substitute for professional medical care. Always follow your healthcare professional's instructions.

## 2018-06-26 ENCOUNTER — OFFICE VISIT (OUTPATIENT)
Dept: FAMILY MEDICINE | Facility: OTHER | Age: 38
End: 2018-06-26
Attending: NURSE PRACTITIONER
Payer: COMMERCIAL

## 2018-06-26 VITALS
WEIGHT: 131.3 LBS | HEART RATE: 93 BPM | BODY MASS INDEX: 19.9 KG/M2 | DIASTOLIC BLOOD PRESSURE: 80 MMHG | HEIGHT: 68 IN | TEMPERATURE: 98 F | SYSTOLIC BLOOD PRESSURE: 138 MMHG

## 2018-06-26 DIAGNOSIS — N89.8 VAGINAL IRRITATION: Primary | ICD-10-CM

## 2018-06-26 LAB
SPECIMEN SOURCE: NORMAL
WET PREP SPEC: NORMAL

## 2018-06-26 PROCEDURE — 87210 SMEAR WET MOUNT SALINE/INK: CPT | Performed by: NURSE PRACTITIONER

## 2018-06-26 PROCEDURE — 99213 OFFICE O/P EST LOW 20 MIN: CPT | Performed by: NURSE PRACTITIONER

## 2018-06-26 ASSESSMENT — PAIN SCALES - GENERAL: PAINLEVEL: NO PAIN (0)

## 2018-06-26 NOTE — NURSING NOTE
Patient presents with vaginal odor, discharge, painful intercourse for 1 month. Patient states she has had BV before. Kelley Martin LPN .............6/26/2018  11:59 AM

## 2018-06-26 NOTE — MR AVS SNAPSHOT
After Visit Summary   6/26/2018    Jailene New    MRN: [id number]           Patient Information     Date Of Birth          1980        Visit Information        Provider Department      6/26/2018 11:30 AM Kathryn Whyte NP [facility]        Care Instructions      Preventing Vaginitis     Use mild, unscented soap when you bathe or shower to avoid irritating your vagina.    Vaginitis is irritation or infection of the vagina or vulva (the outside opening of the vagina). Vaginitis can be caused by bacteria, viruses, parasites, or yeast. Chemicals (such as in perfumes or soaps or in spermicides) can sometimes be a cause. Vaginitis can be caused by hormone changes in pregnancy or with menopause. You can help prevent vaginitis. Follow the tips below. And see your healthcare provider if you have any symptoms.  Hygiene    Avoid chemicals. Do not use vaginal sprays. Do not use scented toilet paper or tampons that are scented. Sprays and scents have chemicals that can irritate your vagina.    Do not douche unless you are told to by your healthcare provider. Douching is rarely needed. And it upsets the normal balance in the vagina.    Wash yourself well. Wash the outer vaginal area (vulva) every day with mild, unscented soap. Keep it as dry as possible.    Wipe correctly. Make sure to wipe from front to back after a bowel movement. This helps keep from spreading bacteria from your anus to your vagina.    Change your tampon often. During your period, make sure to change your tampon as often as directed on the package. This allows the normal flow of vaginal discharge and blood.  Lifestyle    Limit your number of sexual partners. The more partners you have, the greater your risk of infection. Using condoms helps reduce your risk.    Get enough sleep. Sleep helps keep your body s immune system healthy. This helps you fight infection.    Lose weight, if needed. Excess weight can  reduce air circulation around your vagina. This can increase your risk of infection.    Exercise regularly. Regular activity helps keep your body healthy.    Take antibiotics only as directed. Antibiotics can change the normal chemical balance in the vagina.    Clothing    Don t sit in wet clothes. Yeast thrives when it s warm and damp.    Don t wear tight pants. And don t wear tights, leggings, or hose without a cotton crotch. These types of clothing trap warmth and moisture.    Wear cotton underwear. Cotton lets air circulate around the vagina.  Symptoms of vaginitis    Irritation, swelling, or itching of the genital area    Vaginal discharge    Bad vaginal odor    Pain or burning during urination                   Follow-ups after your visit        Who to contact     If you have questions or need follow up information about today's clinic visit or your schedule please contact [facility] AND [facility] directly at [phone].  Normal or non-critical lab and imaging results will be communicated to you by pMediaNetworkhart, letter or phone within 4 business days after the clinic has received the results. If you do not hear from us within 7 days, please contact the clinic through [employer] or phone. If you have a critical or abnormal lab result, we will notify you by phone as soon as possible.  Submit refill requests through [employer] or call your pharmacy and they will forward the refill request to us. Please allow 3 business days for your refill to be completed.          Additional Information About Your Visit        pMediaNetworkharQwickly Information     [employer] gives you secure access to your electronic health record. If you see a primary care provider, you can also send messages to your care team and make appointments. If you have questions, please call your primary care clinic.  If you do not have a primary care provider, please call [phone] and they will assist you.        Care EveryWhere ID     This is your Care EveryWhere  "ID. This could be used by other organizations to access your [place] medical records  [id number]        Your Vitals Were     Pulse Temperature Height Last Period Breastfeeding? BMI (Body Mass Index)    93 98  F (36.7  C) (Tympanic) 5' 7.72\" (1.72 m) 05/30/2018 No 20.13 kg/m2       Blood Pressure from Last 3 Encounters:   06/26/18 138/80   06/06/18 122/80   04/26/18 (!) 148/94    Weight from Last 3 Encounters:   06/26/18 131 lb 4.8 oz (59.6 kg)   06/06/18 128 lb 12.8 oz (58.4 kg)   04/26/18 130 lb 12.8 oz (59.3 kg)              Today, you had the following     No orders found for display       Primary Care Provider Office Phone # Fax #    Kayla JOHNSTON Meng Olvera [phone]       [address] [facility] [address]        Equal Access to Services     [facility]: Hadii aad ku hadasho Soomaali, waaxda luqadaha, qaybta kaalmada adeegyada, waxay keith leach . So [facility] [phone].    ATENCIÓN: Si habla español, tiene a torre disposición servicios gratuitos de asistencia lingüística. Llame al [phone].    We comply with applicable federal civil rights laws and [place] laws. We do not discriminate on the basis of race, color, national origin, age, disability, sex, sexual orientation, or gender identity.            Thank you!     Thank you for choosing [facility]  for your care. Our goal is always to provide you with excellent care. Hearing back from our patients is one way we can continue to improve our services. Please take a few minutes to complete the written survey that you may receive in the mail after your visit with us. Thank you!             Your Updated Medication List - Protect others around you: Learn how to safely use, store and throw away your medicines at www.disposemymeds.org.          This list is accurate as of 6/26/18 12:20 PM.  Always use your most recent med list.                   Brand Name Dispense Instructions for use " Diagnosis    * ADDERALL PO      Take 30 mg by mouth daily In am        * ADDERALL 20 MG per tablet   Generic drug:  amphetamine-dextroamphetamine     60 tablet    Take 1 tablet (20 mg) by mouth daily        ibuprofen 600 MG tablet    ADVIL/MOTRIN     Take 600 mg by mouth every 6 hours as needed        spironolactone 25 MG tablet    ALDACTONE    180 tablet    Take 1 tablet (25 mg) by mouth 2 times daily    Acne vulgaris       TRINESSA LO 0.18/0.215/0.25 MG-25 MCG per tablet   Generic drug:  norgestim-eth estrad triphasic      TK 1 T PO ONE TIME A DAY        valACYclovir 500 MG tablet    VALTREX     Take 500 mg by mouth As needed for cold sores        * Notice:  This list has 2 medication(s) that are the same as other medications prescribed for you. Read the directions carefully, and ask your doctor or other care provider to review them with you.

## 2018-06-26 NOTE — PATIENT INSTRUCTIONS
Preventing Vaginitis     Use mild, unscented soap when you bathe or shower to avoid irritating your vagina.    Vaginitis is irritation or infection of the vagina or vulva (the outside opening of the vagina). Vaginitis can be caused by bacteria, viruses, parasites, or yeast. Chemicals (such as in perfumes or soaps or in spermicides) can sometimes be a cause. Vaginitis can be caused by hormone changes in pregnancy or with menopause. You can help prevent vaginitis. Follow the tips below. And see your healthcare provider if you have any symptoms.  Hygiene    Avoid chemicals. Do not use vaginal sprays. Do not use scented toilet paper or tampons that are scented. Sprays and scents have chemicals that can irritate your vagina.    Do not douche unless you are told to by your healthcare provider. Douching is rarely needed. And it upsets the normal balance in the vagina.    Wash yourself well. Wash the outer vaginal area (vulva) every day with mild, unscented soap. Keep it as dry as possible.    Wipe correctly. Make sure to wipe from front to back after a bowel movement. This helps keep from spreading bacteria from your anus to your vagina.    Change your tampon often. During your period, make sure to change your tampon as often as directed on the package. This allows the normal flow of vaginal discharge and blood.  Lifestyle    Limit your number of sexual partners. The more partners you have, the greater your risk of infection. Using condoms helps reduce your risk.    Get enough sleep. Sleep helps keep your body s immune system healthy. This helps you fight infection.    Lose weight, if needed. Excess weight can reduce air circulation around your vagina. This can increase your risk of infection.    Exercise regularly. Regular activity helps keep your body healthy.    Take antibiotics only as directed. Antibiotics can change the normal chemical balance in the vagina.    Clothing    Don t sit in wet clothes. Yeast thrives  when it s warm and damp.    Don t wear tight pants. And don t wear tights, leggings, or hose without a cotton crotch. These types of clothing trap warmth and moisture.    Wear cotton underwear. Cotton lets air circulate around the vagina.  Symptoms of vaginitis    Irritation, swelling, or itching of the genital area    Vaginal discharge    Bad vaginal odor    Pain or burning during urination

## 2018-06-26 NOTE — PROGRESS NOTES
Nursing Notes:   Kelley Martin LPN  6/26/2018 12:17 PM  Signed  Patient presents with vaginal odor, discharge, painful intercourse for 1 month. Patient states she has had BV before. Kelley Martin LPN .............6/26/2018  11:59 AM    SUBJECTIVE:   37 year old female complains of white and foul vaginal discharge for 1 months.  Denies abnormal vaginal bleeding or significant pelvic pain or  fever. No UTI symptoms. Denies history of known exposure to STD. C/O dyspareunia.    Patient's last menstrual period was 05/30/2018.    Patient Active Problem List   Diagnosis     Acne     Attention deficit disorder     Allergic state     Anxiety state     Bunion, left     Contraceptive management     Raynaud phenomenon       OBJECTIVE:   She appears well, afebrile.  Abdomen: benign, soft, nontender, no masses.  Pelvic Exam: normal vagina and vulva, normal cervix without lesions or tenderness, uterus normal size anteverted, adenxa normal in size without tenderness, wet mount exam shows no abnormal cells.   Urine dipstick: not done.    ASSESSMENT:   nonspecific vaginitis and no pathogens identified causing these symptoms    PLAN:   GC and chlamydia genprobe declined.   Treatment: Hygiene discussed  ROV prn if symptoms persist or worsen.      Kathryn Whyte NP on 6/26/2018 at 2:23 PM

## 2018-07-03 ENCOUNTER — HEALTH MAINTENANCE LETTER (OUTPATIENT)
Age: 38
End: 2018-07-03

## 2019-08-27 ENCOUNTER — OFFICE VISIT (OUTPATIENT)
Dept: FAMILY MEDICINE | Facility: OTHER | Age: 39
End: 2019-08-27
Attending: NURSE PRACTITIONER
Payer: COMMERCIAL

## 2019-08-27 VITALS
HEIGHT: 67 IN | WEIGHT: 128.5 LBS | TEMPERATURE: 98.2 F | BODY MASS INDEX: 20.17 KG/M2 | DIASTOLIC BLOOD PRESSURE: 104 MMHG | RESPIRATION RATE: 16 BRPM | SYSTOLIC BLOOD PRESSURE: 150 MMHG | OXYGEN SATURATION: 98 % | HEART RATE: 83 BPM

## 2019-08-27 DIAGNOSIS — L23.7 CONTACT DERMATITIS DUE TO POISON IVY: Primary | ICD-10-CM

## 2019-08-27 DIAGNOSIS — L03.116 CELLULITIS OF LEFT LOWER EXTREMITY: ICD-10-CM

## 2019-08-27 PROCEDURE — 99214 OFFICE O/P EST MOD 30 MIN: CPT | Performed by: PHYSICIAN ASSISTANT

## 2019-08-27 RX ORDER — SULFAMETHOXAZOLE/TRIMETHOPRIM 800-160 MG
1 TABLET ORAL 2 TIMES DAILY
Qty: 14 TABLET | Refills: 0 | Status: SHIPPED | OUTPATIENT
Start: 2019-08-27 | End: 2019-09-03

## 2019-08-27 RX ORDER — BETAMETHASONE DIPROPIONATE 0.5 MG/G
LOTION TOPICAL 2 TIMES DAILY
Qty: 60 ML | Refills: 0 | Status: SHIPPED | OUTPATIENT
Start: 2019-08-27 | End: 2019-11-18

## 2019-08-27 RX ORDER — MUPIROCIN 20 MG/G
OINTMENT TOPICAL 3 TIMES DAILY
Qty: 15 G | Refills: 0 | Status: SHIPPED | OUTPATIENT
Start: 2019-08-27 | End: 2019-11-18

## 2019-08-27 RX ORDER — CEPHALEXIN 500 MG/1
1000 CAPSULE ORAL 2 TIMES DAILY
Qty: 28 CAPSULE | Refills: 0 | Status: SHIPPED | OUTPATIENT
Start: 2019-08-27 | End: 2019-11-18

## 2019-08-27 ASSESSMENT — MIFFLIN-ST. JEOR: SCORE: 1285.5

## 2019-08-27 ASSESSMENT — PAIN SCALES - GENERAL: PAINLEVEL: NO PAIN (0)

## 2019-08-27 NOTE — PROGRESS NOTES
Nursing Notes:   Marcelle Arevalo LPN  2019  3:59 PM  Signed  Patient presents to the clinic today for a referral to dermatology and ENT. Med rec complete. Marcelle Arevalo LPN.................. 2019 3:56 PM      SUBJECTIVE:   CC:  Jailene Mccoy is a 39 year old female  who presents to clinic today for the following health issues: Referral to dermatologist and ENT.    HPI  Jailene Mccoy is a 39 year old female who presents for referral to dermatology and ENT.  Every time she shaves her legs she will get ingrown hairs.  With this she will get folliculitis from this and scars on her legs.    She has had inflammatory acne.      Separate from this, she contracted poison ivy over this past week.  She has had blistering, bruising on her lower extremities.  She is been using first Zanfel, now cortisone cream to help dry up these areas.    ENT referral request:  History of nasal fracture at age 5.  Last year - she injured it again.  Bad nosebleed.  Can't smell from it.  Both side always sound congested.       No Known Allergies  Current Outpatient Medications   Medication     amphetamine-dextroamphetamine (ADDERALL) 20 MG per tablet     betamethasone dipropionate (DIPROSONE) 0.05 % external lotion     cephALEXin (KEFLEX) 500 MG capsule     ibuprofen (ADVIL/MOTRIN) 600 MG tablet     mupirocin (BACTROBAN) 2 % external ointment     predniSONE (DELTASONE) 20 MG tablet     sulfamethoxazole-trimethoprim (BACTRIM DS/SEPTRA DS) 800-160 MG tablet     valACYclovir (VALTREX) 500 MG tablet     No current facility-administered medications for this visit.       Past Medical History:   Diagnosis Date     Attention-deficit hyperactivity disorder     teen onset/meds thru [facility]     Personal history of other medical treatment (CODE)     G2Para 2-0-0-2      Past Surgical History:   Procedure Laterality Date     BUNIONECTOMY      3-,left      SECTION      ,breech presentation     chin surgery  2008      "HERNIORRHAPHY VENTRAL N/A 4/13/2018    Procedure: HERNIORRHAPHY VENTRAL;  Epigastric Ventral Hernia Repair & Laparoscopic Tubal Ligation;  Surgeon: Bobby Jones MD;  Location: [facility] OR     LAPAROSCOPIC TUBAL LIGATION Bilateral 4/13/2018    Procedure: LAPAROSCOPIC TUBAL LIGATION;;  Surgeon: Ike Krishnan MD;  Location: [facility] OR     wisdom teeth       Family History   Problem Relation Age of Onset     Attention Deficit Disorder Sister      Breast Cancer Maternal Grandmother         Cancer-breast,and lung cancer     Other - See Comments Mother         ADD     SH:  earlier this year    Review of Systems   Constitutional: Negative.    HENT: Negative.    Respiratory: Negative.    Cardiovascular: Negative.    Psychiatric/Behavioral:        On ADHD medication, psychiatric provider        PHQ-2 Score:     PHQ-2 ( 1999 Pf[employer]) 8/28/2019 8/27/2019   Q1: Little interest or pleasure in doing things 0 0   Q2: Feeling down, depressed or hopeless 0 0   PHQ-2 Score 0 0         PHQ-9 SCORE 5/20/2015   PHQ-9 Total Score 0         OBJECTIVE:     BP (!) 130/94   Pulse 76   Temp 98  F (36.7  C) (Tympanic)   Resp 16   Ht 1.702 m (5' 7\")   Wt 59 kg (130 lb)   Breastfeeding? No   BMI 20.36 kg/m    Body mass index is 20.36 kg/m .  Physical Exam   Constitutional: She appears well-developed and well-nourished.   HENT:   No current epistaxis, she does have some clear drainage present.   Skin:   Vesicular rash on lower extremities with clear to yellowish drainage present.    Lower extremity is also have postinflammatory changes, scarring present.   Nursing note and vitals reviewed.           ASSESSMENT/PLAN:       ICD-10-CM    1. Folliculitis L73.9 DERMATOLOGY REFERRAL   2. Contact dermatitis due to poison ivy L23.7 predniSONE (DELTASONE) 20 MG tablet   3. Scar condition and fibrosis of skin L90.5 DERMATOLOGY REFERRAL   4. Closed fracture of nasal bone, sequela S02.2XXS OTOLARYNGOLOGY REFERRAL            PLAN:  1.  Patient is " currently on oral antibiotics for her folliculitis.  We will have her see dermatology related to recurrent folliculitis and scarring, postinflammatory changes that are present.  2.  Prescription for prednisone 20 mill grams twice daily for 5 days for her contact dermatitis, refill this x1 if needed.  Continue with local care such as calamine lotion.  3.  ENT referral related to previous fracture, changes in sense of smell.  Possible that she may also need MRI scan related to this change.      DAISY VAZ MD  [facility] [place]    This note was created using voice recognition software and was screened for errors in transcription.

## 2019-08-27 NOTE — PROGRESS NOTES
"HPI:    Jailene Mccoy is a 39 year old female who presents to clinic today for evaluation of poison ivy on her left leg  Onset 5 days ago, course is spreading  Left leg is the worse, constant weeping, now worsening redness and some warmth  Rash has spread to her abdomen, she has some spots on right ankle and thigh, back is itchy        Past Medical History:   Diagnosis Date     Attention-deficit hyperactivity disorder     teen onset/meds thru [facility]     Personal history of other medical treatment (CODE)     G2Para 2-0-0-2       Current Outpatient Medications   Medication Sig Dispense Refill     Amphetamine-Dextroamphetamine (ADDERALL PO) Take 30 mg by mouth daily In am       amphetamine-dextroamphetamine (ADDERALL) 20 MG per tablet Take 1 tablet (20 mg) by mouth daily 60 tablet 0     ibuprofen (ADVIL/MOTRIN) 600 MG tablet Take 600 mg by mouth every 6 hours as needed       valACYclovir (VALTREX) 500 MG tablet Take 500 mg by mouth As needed for cold sores         No Known Allergies    ROS:  Feels well, no fever  Skin: poison ivy per HPI    EXAM:  Vitals:    08/27/19 1740 08/27/19 1818   BP: (!) 161/105 (!) 150/104   BP Location: Left arm Left arm   Patient Position: Sitting Sitting   Cuff Size: Adult Regular Adult Regular   Pulse: 83    Resp: 16    Temp: 98.2  F (36.8  C)    TempSrc: Tympanic    SpO2: 98%    Weight: 58.3 kg (128 lb 8 oz)    Height: 1.702 m (5' 7\")      General appearance: well appearing female, in no acute distress  Dermatological: poison ivy on left leg, large weeping lesion measures 6 cm x 6 cm with surrounding erythema that is spreading up her leg, warmth and copious drainage of this lesion. A few smaller erythematous vesicular lesions on right ankle, right thigh, abdomen  Psychological: normal affect, alert and pleasant              ASSESSMENT AND PLAN:    1. Contact dermatitis due to poison ivy    2. Cellulitis of left lower extremity      Poison ivy, with secondary infection (cellulits) left " leg  Wash daily with warm soapy water, cover with antibiotic ointment 3 times daily x 7-10 days  Cover with non adherent dressing, guaze and kerlix  Start cephalexin 500 mg oral tablet, take 2 tablets twice daily for 7 days.   Bactrim 1 tablet twice daily x 7 days  For itching apply cool compress or ice 10-20 minutes every 1-2 hours, aveeno oatmeal bath, calamine lotion, baking soda water paste for itching  OTC benadryl 25-50 mg oral every 2-4 hours, max 300 mg/day. Or you can take, cetirizine (geneirc of zyrtec) 10 mg oral tablet.   Topical betamethasone twice daily to lesions for 1-2 weeks. Do not use this on your face or genital area.   Return to clinic if symptoms persist or worsen  Patient received verbal and written instruction including review of warning signs    Giovanna Tellez PA-C on 8/27/2019 at 8:32 PM

## 2019-08-27 NOTE — NURSING NOTE
"Chief Complaint   Patient presents with     Derm Problem     Pt states a rash started friday and has progressively gotten worse and spread to left shin, right calf and thigh, pubic bone, and chest.       Initial BP (!) 161/105 (BP Location: Left arm, Patient Position: Sitting, Cuff Size: Adult Regular)   Pulse 83   Temp 98.2  F (36.8  C) (Tympanic)   Resp 16   Ht 1.702 m (5' 7\")   Wt 58.3 kg (128 lb 8 oz)   SpO2 98%   Breastfeeding? No   BMI 20.13 kg/m   Estimated body mass index is 20.13 kg/m  as calculated from the following:    Height as of this encounter: 1.702 m (5' 7\").    Weight as of this encounter: 58.3 kg (128 lb 8 oz).  Medication Reconciliation: complete  Karen Asher LPN on 8/27/2019 at 5:46 PM    "

## 2019-08-27 NOTE — PATIENT INSTRUCTIONS
Poison ivy, with secondary infection (cellulits) left leg  Wash daily with warm soapy water, cover with antibiotic ointment 3 times daily x 7-10 days  Cover with non adherent dressing, ellie and kerlix  Start cephalexin 500 mg oral tablet, take 2 tablets twice daily for 7 days.   Bactrim 1 tablet twice daily x 7 days  For itching apply cool compress or ice 10-20 minutes every 1-2 hours, aveeno oatmeal bath, calamine lotion, baking soda water paste for itching  OTC benadryl 25-50 mg oral every 2-4 hours, max 300 mg/day. Or you can take, cetirizine (geneirc of zyrtec) 10 mg oral tablet.   Topical betamethasone twice daily to lesions for 1-2 weeks. Do not use this on your face or genital area.   Return to clinic if symptoms persist or worsen  Seek immediate care for    Spreading facial rash with swollen mouth or eyelids    Rash that spreads to the groin and causes swelling of the penis, scrotum, or vaginal area    Trouble urinating because of swelling in the genital area  Also call your provider if you have signs of infection in the areas of broken blisters:    Spreading redness    Pus or fluid draining from the blisters    Yellow-brown crusts form over the open blisters    Fever of 1 degree, or higher, above your normal temperature, or as directed by your provider    Patient Education     Discharge Instructions for Cellulitis  You have been diagnosed with cellulitis. This is an infection in the deepest layer of the skin. In some cases, the infection also affects the muscle. Cellulitis is caused by bacteria. The bacteria can enter the body through broken skin. This can happen with a cut, scratch, animal bite, or an insect bite that has been scratched. You may have been treated in the hospital with antibiotics and fluids. You will likely be given a prescription for antibiotics to take at home. This sheet will help you take care of yourself at home.  Home care  When you are home:    Take the prescribed antibiotic  medicine you are given as directed until it is gone. Take it even if you feel better. It treats the infection and stops it from returning. Not taking all the medicine can make future infections hard to treat.    Keep the infected area clean.    When possible, raise the infected area above the level of your heart. This helps keep swelling down.    Talk with your healthcare provider if you are in pain. Ask what kind of over-the-counter medicine you can take for pain.    Apply clean bandages as advised.    Take your temperature once a day for a week.    Wash your hands often to prevent spreading the infection.  In the future, wash your hands before and after you touch cuts, scratches, or bandages. This will help prevent infection.   When to call your healthcare provider  Call your healthcare provider immediately if you have any of the following:    Difficulty or pain when moving the joints above or below the infected area    Discharge or pus draining from the area    Fever of 100.4 F (38 C) or higher, or as directed by your healthcare provider    Pain that gets worse in or around the infected     Redness that gets worse in or around the infected area, particularly if the area of redness expands to a wider area    Shaking chills    Swelling of the infected area    Vomiting   Date Last Reviewed: 8/1/2016 2000-2018 [employer]. [address] [place] [address], [place] [address]. All rights reserved. This information is not intended as a substitute for professional medical care. Always follow your healthcare professional's instructions.           Patient Education     Poison Ivy Rash  You have a rash and itching. This is a delayed reaction to the oils of the poison ivy plant. You likely came in contact with it during the 3 days before your symptoms began. Your skin will become red and itchy. Small blisters may appear. These can break and leak a clear yellow fluid. This fluid is not contagious. The reaction usually  starts to go away after 1 to 2 weeks. But it may take 4 to 6 weeks to fully clear.    Home care  Follow these guidelines when caring for yourself at home:    The plant oils still on your skin or clothes can be spread to other places on your body. They can also be passed on to other people and cause a similar reaction. That s why it s important to wash all of the plant oils off your skin and any clothes that may have been exposed. Wash all clothes that you were wearing. Use hot water with ordinary laundry detergent.    Don't use over-the-counter creams that have neomycin or bacitracin. These may make the rash worse.    Stay away from anything that heats up your skin. This includes hot showers or baths, or direct sunlight. These can make itching worse.    Put a cold compress on areas that are leaking (weeping), or on blistered areas. Do this for 30 minutes 3 times a day. To make a cold compress, dip a wash cloth in a mixture of 1 pint of cold water and 1 packet of astringent or oatmeal bath powder. Keep the solution in the refrigerator for future use.    If large areas of skin are affected, take a lukewarm bath. Add colloidal oatmeal, or 1 cup of cornstarch or baking soda to the water.    For a rash in a smaller area, use hydrocortisone cream for redness and irritation. But don t use this if another medicine was prescribed. For severe itching, put an ice pack on the area. To make an ice pack, put ice cubes in a plastic bag that seals at the top. Wrap the bag in a clean, thin towel or cloth. Never put ice or an ice pack directly on the skin. Over-the-counter products that have calamine lotion may also be helpful.    You can also use an oral antihistamine medicine with diphenhydramine for itching, unless another medicine was prescribed. This medicine may make you sleepy. So use lower doses during the daytime and higher doses at bedtime. Don t use medicine that has diphenhydramine if you have glaucoma. Also don t use it  if you are a man who has trouble urinating because of an enlarged prostate. Antihistamines with loratidine cause less drowsiness. They are a good choice for daytime use.    For severe cases, your provider may prescribe oral steroid medicines. Always take these exactly as prescribed.  Follow-up care  Follow up with your healthcare provider, or as directed. Call your provider if your rash gets worse or you are not starting to get better after 1 week of treatment.  When to seek medical advice  Call your healthcare provider right away if any of these occur:    Spreading facial rash with swollen mouth or eyelids    Rash that spreads to the groin and causes swelling of the penis, scrotum, or vaginal area    Trouble urinating because of swelling in the genital area  Also call your provider if you have signs of infection in the areas of broken blisters:    Spreading redness    Pus or fluid draining from the blisters    Yellow-brown crusts form over the open blisters    Fever of 1 degree, or higher, above your normal temperature, or as directed by your provider  Call 911  Call 911 if you have severe swelling on your face, eyelids, mouth, throat, or tongue.  Date Last Reviewed: 8/1/2016 2000-2018 [employer]. [address] [place] [address], [place] [address]. All rights reserved. This information is not intended as a substitute for professional medical care. Always follow your healthcare professional's instructions.

## 2019-08-28 ENCOUNTER — OFFICE VISIT (OUTPATIENT)
Dept: FAMILY MEDICINE | Facility: OTHER | Age: 39
End: 2019-08-28
Attending: FAMILY MEDICINE
Payer: COMMERCIAL

## 2019-08-28 VITALS
HEIGHT: 67 IN | HEART RATE: 76 BPM | RESPIRATION RATE: 16 BRPM | SYSTOLIC BLOOD PRESSURE: 130 MMHG | TEMPERATURE: 98 F | DIASTOLIC BLOOD PRESSURE: 94 MMHG | WEIGHT: 130 LBS | BODY MASS INDEX: 20.4 KG/M2

## 2019-08-28 DIAGNOSIS — L73.9 FOLLICULITIS: Primary | ICD-10-CM

## 2019-08-28 DIAGNOSIS — L23.7 CONTACT DERMATITIS DUE TO POISON IVY: ICD-10-CM

## 2019-08-28 DIAGNOSIS — S02.2XXS CLOSED FRACTURE OF NASAL BONE, SEQUELA: ICD-10-CM

## 2019-08-28 DIAGNOSIS — L90.5 SCAR CONDITION AND FIBROSIS OF SKIN: ICD-10-CM

## 2019-08-28 PROCEDURE — 99214 OFFICE O/P EST MOD 30 MIN: CPT | Performed by: FAMILY MEDICINE

## 2019-08-28 RX ORDER — PREDNISONE 20 MG/1
20 TABLET ORAL 2 TIMES DAILY
Qty: 10 TABLET | Refills: 1 | Status: SHIPPED | OUTPATIENT
Start: 2019-08-28 | End: 2019-09-10

## 2019-08-28 ASSESSMENT — MIFFLIN-ST. JEOR: SCORE: 1292.31

## 2019-08-28 ASSESSMENT — PAIN SCALES - GENERAL: PAINLEVEL: MODERATE PAIN (5)

## 2019-08-28 NOTE — PATIENT INSTRUCTIONS
"Patient Education   How to Give Your Child a Bleach Bath  What is a bleach bath and what does it do?  A bleach bath is water with a tiny bit of household bleach added. The water thins out (dilutes) the bleach so that the bath water becomes like the water in a swimming pool.  Bleach baths help fight germs on the skin. These germs, or bacteria, can keep skin from healing. A bleach bath can also calm inflamed skin even if it is not infected. Bleach baths are safe for almost everyone as long as you don't use too much bleach.  What kind of bleach should I use?       Use plain, household bleach--the same kind you use to clean your clothes. Clorox brand, store brands, and generic bleach are all OK.    Make sure it is plain bleach. Do NOT use \"splash free, splashless or color safe.\"    Do NOT use bleach with added fragrance, like lavender.  How do I make a bleach bath?  1. Fill your tub with at least 4 to 6 inches of lukewarm water. Bleach baths work best when your child can soak in the water.  2. Add bleach as the tub fills with water:  For REGULAR bleach:    Adult size tub: Add 1/4 to 1/2 cup of bleach.    Baby tub: Add 2 tablespoons of bleach.  For CONCENTRATED bleach:    Adult size tub: Add 3 tablespoons to 1/3 cup of bleach.    Baby tub: Add 4 teaspoons of bleach.  How do I give a bleach bath?  1. Give your child a bath just like you do every day. But do NOT add any soap or other products to the water.  2. Let your child play while you bathe their body, face and scalp with the water.  3. After about 10 minutes, you may use a gentle cleanser to wash your child if you wish.  4. Rinse off the bleach water with plain water.  5. Dry your child as usual.  Repeat bleach baths as your provider recommends.  What else do I need to know?    It is safe to get the bath water on your face and scalp.    Do NOT pour bleach directly onto the skin.    Do NOT let your child drink the bleach bath water.    Keep the bleach bottle out of " your children's reach.    Prepared by the [facility] Division of Pediatric Dermatology. For informational purposes only. Not to replace the advice of your health care provider. Copyright   2017 [facility]. All rights reserved. [employer] [id number] - 2/17.

## 2019-08-28 NOTE — NURSING NOTE
Patient presents to the clinic today for a referral to dermatology and ENT. Med rec complete. Marcelle Arevalo LPN.................. 8/28/2019 3:56 PM

## 2019-08-30 ASSESSMENT — ENCOUNTER SYMPTOMS
CARDIOVASCULAR NEGATIVE: 1
RESPIRATORY NEGATIVE: 1
CONSTITUTIONAL NEGATIVE: 1

## 2019-09-10 DIAGNOSIS — L23.7 CONTACT DERMATITIS DUE TO POISON IVY: Primary | ICD-10-CM

## 2019-09-11 ENCOUNTER — OFFICE VISIT (OUTPATIENT)
Dept: FAMILY MEDICINE | Facility: OTHER | Age: 39
End: 2019-09-11
Attending: NURSE PRACTITIONER
Payer: COMMERCIAL

## 2019-09-11 VITALS
HEIGHT: 67 IN | DIASTOLIC BLOOD PRESSURE: 86 MMHG | RESPIRATION RATE: 20 BRPM | BODY MASS INDEX: 20.44 KG/M2 | SYSTOLIC BLOOD PRESSURE: 128 MMHG | WEIGHT: 130.25 LBS | HEART RATE: 88 BPM | TEMPERATURE: 97.9 F | OXYGEN SATURATION: 99 %

## 2019-09-11 DIAGNOSIS — L23.7 CONTACT DERMATITIS DUE TO POISON IVY: Primary | ICD-10-CM

## 2019-09-11 PROCEDURE — 99213 OFFICE O/P EST LOW 20 MIN: CPT | Performed by: NURSE PRACTITIONER

## 2019-09-11 RX ORDER — DEXTROAMPHETAMINE SULFATE, DEXTROAMPHETAMINE SACCHARATE, AMPHETAMINE SULFATE AND AMPHETAMINE ASPARTATE 7.5; 7.5; 7.5; 7.5 MG/1; MG/1; MG/1; MG/1
CAPSULE, EXTENDED RELEASE ORAL
Refills: 0 | COMMUNITY
Start: 2019-08-28 | End: 2023-03-14

## 2019-09-11 RX ORDER — DEXTROAMPHETAMINE SACCHARATE, AMPHETAMINE ASPARTATE, DEXTROAMPHETAMINE SULFATE AND AMPHETAMINE SULFATE 7.5; 7.5; 7.5; 7.5 MG/1; MG/1; MG/1; MG/1
TABLET ORAL
Refills: 0 | COMMUNITY
Start: 2018-09-15 | End: 2021-04-20

## 2019-09-11 RX ORDER — PREDNISONE 20 MG/1
TABLET ORAL
Qty: 10 TABLET | Refills: 0 | Status: SHIPPED | OUTPATIENT
Start: 2019-09-11 | End: 2019-11-18

## 2019-09-11 RX ORDER — PREDNISONE 20 MG/1
TABLET ORAL
Qty: 27 TABLET | Refills: 0 | Status: SHIPPED | OUTPATIENT
Start: 2019-09-11 | End: 2019-11-18

## 2019-09-11 ASSESSMENT — PAIN SCALES - GENERAL: PAINLEVEL: MILD PAIN (2)

## 2019-09-11 ASSESSMENT — MIFFLIN-ST. JEOR: SCORE: 1293.44

## 2019-09-11 NOTE — TELEPHONE ENCOUNTER
[facility] sent Rx request for the following:    Patient called stating PT IS OUT OF MEDICATION. PT REQUESTING CALL BACK WITH REFILL STATUS.     PREDNISONE 20MG TABLETS  Sig: TAKE 1 TABLET BY MOUTH TWICE DAILY FOR 5 DAYS  Last Prescription Date:   8/28/19  Last Fill Qty/Refills:         10, R-1 (End: 9/2/19)    Last Office Visit:              8/28/19  Future Office visit:           None.  Routing refill request to provider for review/approval because:  Drug not on the [facility], [facility] or [facility] refill protocol or controlled substance    Per LOV Note:  Contact dermatitis due to poison ivy: Prescription for prednisone 20 mill grams twice daily for 5 days for her contact dermatitis, refill this x1 if needed. Continue with local care such as calamine lotion.    Attempted reaching Patient for more information. Voicemail not set up yet.    Will route to PCP, for refill consideration. Unable to complete prescription refill per RN Medication Refill Policy. Mariana Krishnamurthy RN .............. 9/11/2019  10:21 AM

## 2019-09-11 NOTE — TELEPHONE ENCOUNTER
Patient returned call and her last name and  were verified. Pt states her symptoms have continued. She had been on 2 abx, then was prescribed prednisone the next day. She took the 1st 5-day course and had relief. Two days later, her symptoms returned. She took the 2nd 5-day course and has been done for 3-4 days now. Yesterday she woke up with itching around 0400. Throughout the day, she started getting bumps again, and noted 2 pus-filled blisters on her left lower leg, where infection was previously. She now has a light rash all over her lower back, bra line and stomach.    Pt requesting refill.    Routing to PCP for review.    Mariana Krishnamurthy RN .............. 2019  10:40 AM

## 2019-09-11 NOTE — NURSING NOTE
Patient presents to clinic after poison ivy reoccurred with outbreak    Medication Reconciliation: complete    Mariana Levin, LINN

## 2019-09-11 NOTE — PROGRESS NOTES
SUBJECTIVE:   Jailene Mccoy is a 39 year old female who presents to clinic today for the following health issues:    HPI  Patient presents for re-occurrence of her rash. She had previously been treated for poison ivy. She took two course of steroids each 5 days of treatment. She reports symptoms would improve and then when steroids were completed rash would erupt. She completed her last course a few days ago and rash returned. She notes left leg is red, swollen and blistering. She has no fever/chills. Area is very pruritic. She also noticed some erythema on her trunk. She tried benadryl last night with little relief. She does not think she has had contact with poison ivy again. She notes it is everywhere in the yard. She does have two family pets. No one else in the family had a rash.  She has a consult for dermatology and is awaiting her appointment.     Patient Active Problem List    Diagnosis Date Noted     Attention deficit disorder 2018     Priority: Medium     Allergic state 2018     Priority: Medium     Anxiety state 2018     Priority: Medium     Overview:   panic attacks       Contraceptive management 2018     Priority: Medium     Overview:        Raynaud phenomenon 10/31/2013     Priority: Medium     Bunion, left 2013     Priority: Medium     Acne 2012     Priority: Medium     Past Medical History:   Diagnosis Date     Attention-deficit hyperactivity disorder     teen onset/meds thru [facility]     Personal history of other medical treatment (CODE)     G2Para 2-0-0-2      Past Surgical History:   Procedure Laterality Date     BUNIONECTOMY      3-,left      SECTION      ,breech presentation     chin surgery  2008     HERNIORRHAPHY VENTRAL N/A 2018    Procedure: HERNIORRHAPHY VENTRAL;  Epigastric Ventral Hernia Repair & Laparoscopic Tubal Ligation;  Surgeon: Bobby Jones MD;  Location: [facility] OR     LAPAROSCOPIC TUBAL LIGATION Bilateral 2018  "   Procedure: LAPAROSCOPIC TUBAL LIGATION;;  Surgeon: Ike Krishnan MD;  Location: [facility] OR     wisdom teeth         Review of Systems   Skin: Positive for rash.        OBJECTIVE:     /86 (BP Location: Right arm, Patient Position: Sitting, Cuff Size: Adult Regular)   Pulse 88   Temp 97.9  F (36.6  C) (Tympanic)   Resp 20   Ht 1.702 m (5' 7\")   Wt 59.1 kg (130 lb 4 oz)   SpO2 99%   Breastfeeding? No   BMI 20.40 kg/m    Body mass index is 20.4 kg/m .  Physical Exam    Rash on left foot and lower leg.     Diagnostic Test Results:  none     ASSESSMENT/PLAN:   1. Contact dermatitis due to poison ivy  As previously responded to oral prednisone we will trial a longer treatment to prevent rebound. She will take 19 days of a steroid taper. Continue to use po benadryl to help with itching. Ok for topical if needed. We discussed to watch for signs of infection as she has open blisters. She will have re-evaluation if not improving with steroids or signs of infection develop. Continue with your appointment with dermatology. Avoid contact with plants and clean household surfaces that may have oils.   - predniSONE (DELTASONE) 20 MG tablet; Take 3 tabs by mouth daily x 3 days, then 2 tabs daily x 5 days, then 1 tab daily x 5 days, then 1/2 tab daily x 6 days.  Dispense: 27 tablet; Refill: 0    Lien Ruelas [facility]    "

## 2019-11-12 ENCOUNTER — TELEPHONE (OUTPATIENT)
Dept: OTOLARYNGOLOGY | Facility: OTHER | Age: 39
End: 2019-11-12

## 2019-11-12 DIAGNOSIS — S02.2XXS CLOSED FRACTURE OF NASAL BONE, SEQUELA: Primary | ICD-10-CM

## 2019-11-12 NOTE — TELEPHONE ENCOUNTER
----- Message from Cheryl Dolan MD sent at 11/12/2019  9:03 AM CST -----  Regarding: needs film  No recent imaging at all, if hx of fracture order CT facial bones in [place] or [place] prior to seeing me

## 2019-11-12 NOTE — TELEPHONE ENCOUNTER
Per Dr. Dolan, this patient is to complete a scan prior to her appointment on Monday. Order pended to sign.

## 2019-11-14 ENCOUNTER — HOSPITAL ENCOUNTER (OUTPATIENT)
Dept: CT IMAGING | Facility: OTHER | Age: 39
Discharge: HOME | End: 2019-11-14
Attending: OTOLARYNGOLOGY | Admitting: FAMILY MEDICINE
Payer: COMMERCIAL

## 2019-11-14 DIAGNOSIS — S02.2XXS CLOSED FRACTURE OF NASAL BONE, SEQUELA: ICD-10-CM

## 2019-11-14 PROCEDURE — 70486 CT MAXILLOFACIAL W/O DYE: CPT

## 2019-11-18 ENCOUNTER — OFFICE VISIT (OUTPATIENT)
Dept: OTOLARYNGOLOGY | Facility: OTHER | Age: 39
End: 2019-11-18
Attending: OTOLARYNGOLOGY
Payer: COMMERCIAL

## 2019-11-18 VITALS
HEART RATE: 94 BPM | HEIGHT: 67 IN | OXYGEN SATURATION: 100 % | WEIGHT: 130 LBS | BODY MASS INDEX: 20.4 KG/M2 | SYSTOLIC BLOOD PRESSURE: 136 MMHG | TEMPERATURE: 98.3 F | DIASTOLIC BLOOD PRESSURE: 84 MMHG

## 2019-11-18 DIAGNOSIS — J34.829 NASAL VALVE COLLAPSE: ICD-10-CM

## 2019-11-18 DIAGNOSIS — J34.3 NASAL TURBINATE HYPERTROPHY: Primary | ICD-10-CM

## 2019-11-18 DIAGNOSIS — S02.2XXG CLOSED FRACTURE OF NASAL BONE WITH DELAYED HEALING, SUBSEQUENT ENCOUNTER: ICD-10-CM

## 2019-11-18 DIAGNOSIS — J34.3 NASAL TURBINATE HYPERTROPHY: ICD-10-CM

## 2019-11-18 DIAGNOSIS — R43.0 ANOSMIA: ICD-10-CM

## 2019-11-18 DIAGNOSIS — G47.30 SLEEP APNEA, UNSPECIFIED TYPE: ICD-10-CM

## 2019-11-18 PROCEDURE — 31231 NASAL ENDOSCOPY DX: CPT | Performed by: OTOLARYNGOLOGY

## 2019-11-18 PROCEDURE — 99204 OFFICE O/P NEW MOD 45 MIN: CPT | Mod: 25 | Performed by: OTOLARYNGOLOGY

## 2019-11-18 RX ORDER — FLUTICASONE PROPIONATE 50 MCG
2 SPRAY, SUSPENSION (ML) NASAL DAILY
Qty: 16 G | Refills: 12 | Status: SHIPPED | OUTPATIENT
Start: 2019-11-18 | End: 2020-04-17

## 2019-11-18 ASSESSMENT — MIFFLIN-ST. JEOR: SCORE: 1292.31

## 2019-11-18 ASSESSMENT — PAIN SCALES - GENERAL: PAINLEVEL: NO PAIN (0)

## 2019-11-18 NOTE — PROGRESS NOTES
Otolaryngology Consultation    Patient: Jailene Mccoy  : 1980    Patient presents with:  Consult For: Closed Fracture of Nasal Bone; Referred by Dr. Meng Olvera      HPI:  Jailene Mccoy is a 39 year old female seen today for nasal congestion, lack of smell.  She has a history of nasal bone fracture as a child.  She subsequently ran into a monkey bar with the dorsum of her nose about 2 years ago with LOC and mild epistaxis.  She had some mild periorbital ecchymosis a few days after.  She did not have imaging with recent incident.  She had decreased smell prior to this injury, but has noted worse sense of smell and taste since the nasal injury.  She has working smoke detectors.    She cannot breath through the nose at night, and family notes that she snores.    She describes frequent rescue breathing which is been present for years   Jailene has chronic daytime somnolence.   No fina facial pain or pressure.  No frequent rhinorrhea.    Hx of braces as adult    Jailene states she does not like the appearance of her nose.  She points to her dorsum and tip as concerns and states her nose looks too big.     She has tried steroid spray for 2-3 weeks without improvement in the past.  She has also used some saline rinses in the past with temporary relief.      No history of seasonal allergies.  She was allergy tested in the past with allergies to dust.  She uses dust covers over the mattress and is diligent about cleaning and dusting.        CT facial bones was reviewed dated 2019  below    Prior evidence of comminuted right nasal bone fracture without displacement which is in excellent alignment    She denies chronic use of afrin or neosynepherine         Current Outpatient Rx   Medication Sig Dispense Refill     ADDERALL XR 30 MG 24 hr capsule 19 SANDHYA TAKE 1 CAPSULE BY MOUTH EVERY MORNING  0     amphetamine-dextroamphetamine (ADDERALL) 30 MG tablet TAKE 1 TABLET BY MOUTH EVERY MORNING FILL  "ON OR AFTER 9-15-18  0     valACYclovir (VALTREX) 500 MG tablet Take 500 mg by mouth As needed for cold sores         Allergies: Patient has no known allergies.     Past Medical History:   Diagnosis Date     Attention-deficit hyperactivity disorder     teen onset/meds thru [facility]     Personal history of other medical treatment (CODE)     G2Para 2-0-0-2       Past Surgical History:   Procedure Laterality Date     BUNIONECTOMY      3-,left      SECTION      ,breech presentation     chin surgery  2008     HERNIORRHAPHY VENTRAL N/A 2018    Procedure: HERNIORRHAPHY VENTRAL;  Epigastric Ventral Hernia Repair & Laparoscopic Tubal Ligation;  Surgeon: Bobby Jones MD;  Location: [facility] OR     LAPAROSCOPIC TUBAL LIGATION Bilateral 2018    Procedure: LAPAROSCOPIC TUBAL LIGATION;;  Surgeon: Ike Krishnan MD;  Location: [facility] OR     wisdom teeth         ENT family history reviewed    Social History     Tobacco Use     Smoking status: Never Smoker     Smokeless tobacco: Never Used   Substance Use Topics     Alcohol use: Yes     Comment:  occasionally     Drug use: No       Review of Systems  ROS: 10 point ROS neg other than the symptoms noted above in the HPI     Physical Exam  /84   Pulse 94   Temp 98.3  F (36.8  C) (Tympanic)   Ht 1.702 m (5' 7\")   Wt 59 kg (130 lb)   SpO2 100%   BMI 20.36 kg/m    General - The patient is well nourished and well developed, and appears to have good nutritional status.  Alert and oriented to person and place, answers questions and cooperates with examination appropriately.   Head and Face - Normocephalic and atraumatic, with no gross asymmetry noted.  The facial nerve is intact, with strong symmetric movements.  Voice and Breathing - The patient was breathing comfortably without the use of accessory muscles. There was no wheezing, stridor, or stertor.  The patients voice was clear and strong, and had appropriate pitch and quality.  No fina peripheral " digital clubbing or cyanosis   Ears -The external auditory canals are patent, the tympanic membranes are intact without effusion, retraction or mass.  Bony landmarks are intact.  Eyes - Extraocular movements intact, and the pupils were reactive to light.  Sclera were not icteric or injected, conjunctiva were pink and moist.  Mouth - Examination of the oral cavity showed pink, healthy oral mucosa. No lesions or ulcerations noted.  The tongue was mobile and midline, and the dentition were in good condition.    Throat - The walls of the oropharynx were smooth, pink, moist, symmetric, and had no lesions or ulcerations.  The tonsillar pillars and soft palate were symmetric.  The uvula was midline on elevation.  Grade 2 tonsils, Amaro Tongue Position I.  Slight retrognathia.  Neck - No palpable enlarged fixed cervical lymph nodes.  No neck cysts or unusual tenderness to palpation.   No palpable fixed thyroid nodules or concerning goiter.  The trachea is grossly midline.   Nose -Slight dorsal hump, dorsum midline.  I cannot appreciate any fina deviation.  There is very subtle right caudal septal deviation and bilateral internal nasal valve collapse with improvement in breathing with modified Fior maneuver.    Nasal mucosa is pink and moist with no abnormal mucus.  The septum and turbinates were evaluated:  inferior turbinate hypertrophy noted.  No polyps, masses, or purulence noted on examination.      To evaluate the nose and sinuses, I performed rigid nasal endoscopy. The LPN had previously sprayed both nares with lidocaine and neosynephrine.    I began with the LEFT side using a 0 degree rigid nasal endoscope, and then similarly examined the RIGHT side    Findings:  Inferior turbinates:  enlarged  Middle turbinate and middle meatus:  No purulence, no polyposis,   Mucosa is healthy throughout,  no polyps nor polypoid degeneration  Nasopharynx clear  The patient tolerated the procedure well    Imaging  CT facial  bones was reviewed dated 11/14/2019 the sinuses are clear throughout the septum is midline there is turbinate hypertrophy the skull base is intact    Prior evidence of comminuted right nasal bone fracture without displacement which is in excellent alignment      Impression and Plan- Jailene Mccoy is a 39 year old female with:    ICD-10-CM    1. Nasal turbinate hypertrophy J34.3 fluticasone (FLONASE) 50 MCG/ACT nasal spray   2. Closed fracture of nasal bone with delayed healing, subsequent encounter S02.2XXG OTOLARYNGOLOGY REFERRAL   3. Nasal valve collapse J34.89    4. Sleep apnea, unspecified type G47.30 SLEEP EVALUATION & MANAGEMENT REFERRAL - ADULT -[facility] [phone] (Age 13 and up) ([facility])   5. Anosmia R43.0        Based on her oropharyngeal nasal anatomy I would be surprised if she has purely obstructive sleep apnea but based on her symptoms she is describing apneic episodes and she does have daytime somnolence she may have a mixed or central apnea.  Sleep study is pending.    Recommend oral appliance for snoring, f/u with DDS    Start Flonase correct use was reinforced    I have referred her to Dr. Kinney whom her sister is also seeing.  I told chronic I think her nose looks beautiful and she has just a very subtle dorsal hump.  At any rate her nose really bothers her so I have placed the referral.  Told her that surgery is unlikely to affect her sense of smell and taste and there is a chance of complete anosmia with nasal surgery although this is very rare.  Minimally would consider in office turbinate reduction versus surgical turbinate reduction and nasal valve repair however she has cosmetic concerns.  I think she would do better with sedation.     Risks of untreated sleep apnea are well documented, including but not limited to, the inability to reach restorative sleep leading to daytime somnolence, fatigue, irritability and poor work performance, risk  of motor vehicle accidents, depression, memory issues, waking headaches, impotence, and increased nocturia.  More serious risks include concerns with medication/narcotic use and surgery related to the use of anesthesia, coronary artery disease, heart failure, heart attack, stroke and sudden death.    Achieving a healthy weight, adhering to a healthy diet, and exercise are very important in the treatment of sleep apnea and were discussed and encouraged.    Clinical evidence shows CPAP to be the treatment of choice for obstructive sleep apnea. However, if CPAP is not tolerated after reasonable attempts at treatment, surgical intervention may be necessary.   A sleep study will be arranged if this has not already been done.      The most common cause of loss of sense of smell (anosmia) is a viral infection, followed by trauma (particularly in younger patients)     The remainder of today's visit was spent discussing anosmia.  I stressed that according to my experience and the clinical research, the underlying cause of anosmia is actually rarely found.  I have explained the indications for a brain MRI, and the cost versus benefit of MRI and the fact that MRI is a very low yield test in finding a central problem causing anosmia or taste disorder.    Dental follow up may be recommended to rule out dental disease, which was not obviously present today. Any history of trauma to the oral cavity or head involving metal/gunshot may cause lead leaching.  A lead level may be checked.    Next we discussed important precautions to take when one has an altered sense of smell.  For example, I recommended use of natural gas and carbon monoxide detection in the home, and being very careful with expiration dates on foods.    Finally, alternative techniques to make food more interesting were also discussed.  These included spiciness, visual appeal, and more interesting textures.    There were no local mouth or nose abnormalities found on  today's exam.      Follow up as needed for any additional ENT concerns.            Cheryl Dolan D.O.  Otolaryngology/Head and Neck Surgery  Allergy

## 2019-11-18 NOTE — NURSING NOTE
"Chief Complaint   Patient presents with     Consult For     Closed Fracture of Nasal Bone; Referred by Dr. Meng Olvera       Initial /84   Pulse 94   Temp 98.3  F (36.8  C) (Tympanic)   Ht 1.702 m (5' 7\")   Wt 59 kg (130 lb)   SpO2 100%   BMI 20.36 kg/m   Estimated body mass index is 20.36 kg/m  as calculated from the following:    Height as of this encounter: 1.702 m (5' 7\").    Weight as of this encounter: 59 kg (130 lb).  Medication Reconciliation: complete  Stephanie Gill LPN  "

## 2019-11-18 NOTE — LETTER
2019         RE: Jailene Mccoy  [address] [facility] [address]        Dear Colleague,    Thank you for referring your patient, Jailene Mccoy, to the [facility]. Please see a copy of my visit note below.    Otolaryngology Consultation    Patient: Jailene Mccoy  : 1980    Patient presents with:  Consult For: Closed Fracture of Nasal Bone; Referred by Dr. Meng Olvera      HPI:  Jailene Mccoy is a 39 year old female seen today for nasal congestion, lack of smell.  She has a history of nasal bone fracture as a child.  She subsequently ran into a monkey bar with the dorsum of her nose about 2 years ago with LOC and mild epistaxis.  She had some mild periorbital ecchymosis a few days after.  She did not have imaging with recent incident.  She had decreased smell prior to this injury, but has noted worse sense of smell and taste since the nasal injury.  She has working smoke detectors.    She cannot breath through the nose at night, and family notes that she snores.    She describes frequent rescue breathing which is been present for years   Jailene has chronic daytime somnolence.   No fina facial pain or pressure.  No frequent rhinorrhea.    Hx of braces as adult    Jailene states she does not like the appearance of her nose.  She points to her dorsum and tip as concerns and states her nose looks too big.     She has tried steroid spray for 2-3 weeks without improvement in the past.  She has also used some saline rinses in the past with temporary relief.      No history of seasonal allergies.  She was allergy tested in the past with allergies to dust.  She uses dust covers over the mattress and is diligent about cleaning and dusting.        CT facial bones was reviewed dated 2019  below    Prior evidence of comminuted right nasal bone fracture without displacement which is in excellent alignment    She denies chronic use of afrin or  "neosynepherine         Current Outpatient Rx   Medication Sig Dispense Refill     ADDERALL XR 30 MG 24 hr capsule 19 SANDHYA TAKE 1 CAPSULE BY MOUTH EVERY MORNING  0     amphetamine-dextroamphetamine (ADDERALL) 30 MG tablet TAKE 1 TABLET BY MOUTH EVERY MORNING FILL ON OR AFTER 9-15-18  0     valACYclovir (VALTREX) 500 MG tablet Take 500 mg by mouth As needed for cold sores         Allergies: Patient has no known allergies.     Past Medical History:   Diagnosis Date     Attention-deficit hyperactivity disorder     teen onset/meds thru [facility]     Personal history of other medical treatment (CODE)     G2Para 2-0-0-2       Past Surgical History:   Procedure Laterality Date     BUNIONECTOMY      3-,left      SECTION      ,breech presentation     chin surgery  2008     HERNIORRHAPHY VENTRAL N/A 2018    Procedure: HERNIORRHAPHY VENTRAL;  Epigastric Ventral Hernia Repair & Laparoscopic Tubal Ligation;  Surgeon: Bobby Jones MD;  Location: [facility] OR     LAPAROSCOPIC TUBAL LIGATION Bilateral 2018    Procedure: LAPAROSCOPIC TUBAL LIGATION;;  Surgeon: Ike Krishnan MD;  Location: [facility] OR     wisdom teeth         ENT family history reviewed    Social History     Tobacco Use     Smoking status: Never Smoker     Smokeless tobacco: Never Used   Substance Use Topics     Alcohol use: Yes     Comment:  occasionally     Drug use: No       Review of Systems  ROS: 10 point ROS neg other than the symptoms noted above in the HPI     Physical Exam  /84   Pulse 94   Temp 98.3  F (36.8  C) (Tympanic)   Ht 1.702 m (5' 7\")   Wt 59 kg (130 lb)   SpO2 100%   BMI 20.36 kg/m     General - The patient is well nourished and well developed, and appears to have good nutritional status.  Alert and oriented to person and place, answers questions and cooperates with examination appropriately.   Head and Face - Normocephalic and atraumatic, with no gross asymmetry noted.  The facial nerve is intact, with strong " symmetric movements.  Voice and Breathing - The patient was breathing comfortably without the use of accessory muscles. There was no wheezing, stridor, or stertor.  The patients voice was clear and strong, and had appropriate pitch and quality.  No fina peripheral digital clubbing or cyanosis   Ears -The external auditory canals are patent, the tympanic membranes are intact without effusion, retraction or mass.  Bony landmarks are intact.  Eyes - Extraocular movements intact, and the pupils were reactive to light.  Sclera were not icteric or injected, conjunctiva were pink and moist.  Mouth - Examination of the oral cavity showed pink, healthy oral mucosa. No lesions or ulcerations noted.  The tongue was mobile and midline, and the dentition were in good condition.    Throat - The walls of the oropharynx were smooth, pink, moist, symmetric, and had no lesions or ulcerations.  The tonsillar pillars and soft palate were symmetric.  The uvula was midline on elevation.  Grade 2 tonsils, Amaro Tongue Position I.  Slight retrognathia.  Neck - No palpable enlarged fixed cervical lymph nodes.  No neck cysts or unusual tenderness to palpation.   No palpable fixed thyroid nodules or concerning goiter.  The trachea is grossly midline.   Nose -Slight dorsal hump, dorsum midline.  I cannot appreciate any fina deviation.  There is very subtle right caudal septal deviation and bilateral internal nasal valve collapse with improvement in breathing with modified Fior maneuver.    Nasal mucosa is pink and moist with no abnormal mucus.  The septum and turbinates were evaluated:  inferior turbinate hypertrophy noted.  No polyps, masses, or purulence noted on examination.      To evaluate the nose and sinuses, I performed rigid nasal endoscopy. The LPN had previously sprayed both nares with lidocaine and neosynephrine.    I began with the LEFT side using a 0 degree rigid nasal endoscope, and then similarly examined the RIGHT  side    Findings:  Inferior turbinates:  enlarged  Middle turbinate and middle meatus:  No purulence, no polyposis,   Mucosa is healthy throughout,  no polyps nor polypoid degeneration  Nasopharynx clear  The patient tolerated the procedure well    Imaging  CT facial bones was reviewed dated 11/14/2019 the sinuses are clear throughout the septum is midline there is turbinate hypertrophy the skull base is intact    Prior evidence of comminuted right nasal bone fracture without displacement which is in excellent alignment      Impression and Plan- Jailenemarcial Mccoy is a 39 year old female with:    ICD-10-CM    1. Nasal turbinate hypertrophy J34.3 fluticasone (FLONASE) 50 MCG/ACT nasal spray   2. Closed fracture of nasal bone with delayed healing, subsequent encounter S02.2XXG OTOLARYNGOLOGY REFERRAL   3. Nasal valve collapse J34.89    4. Sleep apnea, unspecified type G47.30 SLEEP EVALUATION & MANAGEMENT REFERRAL - ADULT -[facility] [phone] (Age 13 and up) ([facility])   5. Anosmia R43.0        Based on her oropharyngeal nasal anatomy I would be surprised if she has purely obstructive sleep apnea but based on her symptoms she is describing apneic episodes and she does have daytime somnolence she may have a mixed or central apnea.  Sleep study is pending.    Recommend oral appliance for snoring, f/u with DDS    Start Flonase correct use was reinforced    I have referred her to Dr. Kinney whom her sister is also seeing.  I told chronic I think her nose looks beautiful and she has just a very subtle dorsal hump.  At any rate her nose really bothers her so I have placed the referral.  Told her that surgery is unlikely to affect her sense of smell and taste and there is a chance of complete anosmia with nasal surgery although this is very rare.  Minimally would consider in office turbinate reduction versus surgical turbinate reduction and nasal valve repair however she has  cosmetic concerns.  I think she would do better with sedation.     Risks of untreated sleep apnea are well documented, including but not limited to, the inability to reach restorative sleep leading to daytime somnolence, fatigue, irritability and poor work performance, risk of motor vehicle accidents, depression, memory issues, waking headaches, impotence, and increased nocturia.  More serious risks include concerns with medication/narcotic use and surgery related to the use of anesthesia, coronary artery disease, heart failure, heart attack, stroke and sudden death.    Achieving a healthy weight, adhering to a healthy diet, and exercise are very important in the treatment of sleep apnea and were discussed and encouraged.    Clinical evidence shows CPAP to be the treatment of choice for obstructive sleep apnea. However, if CPAP is not tolerated after reasonable attempts at treatment, surgical intervention may be necessary.   A sleep study will be arranged if this has not already been done.      The most common cause of loss of sense of smell (anosmia) is a viral infection, followed by trauma (particularly in younger patients)     The remainder of today's visit was spent discussing anosmia.  I stressed that according to my experience and the clinical research, the underlying cause of anosmia is actually rarely found.  I have explained the indications for a brain MRI, and the cost versus benefit of MRI and the fact that MRI is a very low yield test in finding a central problem causing anosmia or taste disorder.    Dental follow up may be recommended to rule out dental disease, which was not obviously present today. Any history of trauma to the oral cavity or head involving metal/gunshot may cause lead leaching.  A lead level may be checked.    Next we discussed important precautions to take when one has an altered sense of smell.  For example, I recommended use of natural gas and carbon monoxide detection in the home,  and being very careful with expiration dates on foods.    Finally, alternative techniques to make food more interesting were also discussed.  These included spiciness, visual appeal, and more interesting textures.    There were no local mouth or nose abnormalities found on today's exam.      Follow up as needed for any additional ENT concerns.            Cheryl Dolan D.O.  Otolaryngology/Head and Neck Surgery  Allergy        Again, thank you for allowing me to participate in the care of your patient.        Sincerely,        Cheryl Dolan MD

## 2019-11-18 NOTE — PATIENT INSTRUCTIONS
Thank you for allowing Dr. Dolan and our ENT team to participate in your care.  If your medications are too expensive, please give the nurse a call.  We can possibly change this medication.  If you have a scheduling or an appointment question please contact our Health Unit Coordinator at their direct line [phone].   ALL nursing questions or concerns can be directed to your ENT nurse at: [phone] - Anjali    Follow up with Dr. Kinney

## 2019-11-20 RX ORDER — FLUTICASONE PROPIONATE 50 MCG
SPRAY, SUSPENSION (ML) NASAL
Qty: 48 ML | Refills: 12 | OUTPATIENT
Start: 2019-11-20

## 2020-03-11 ENCOUNTER — HEALTH MAINTENANCE LETTER (OUTPATIENT)
Age: 40
End: 2020-03-11

## 2020-04-15 ENCOUNTER — TELEPHONE (OUTPATIENT)
Dept: FAMILY MEDICINE | Facility: OTHER | Age: 40
End: 2020-04-15

## 2020-04-15 NOTE — TELEPHONE ENCOUNTER
After patient's name and date of birth verified the below information was given.  Transferred patient to scheduling to assist in making appointment.     Irasema Lopez LPN ---- 4/15/2020 1:57 PM

## 2020-04-17 ENCOUNTER — VIRTUAL VISIT (OUTPATIENT)
Dept: FAMILY MEDICINE | Facility: OTHER | Age: 40
End: 2020-04-17
Attending: FAMILY MEDICINE
Payer: COMMERCIAL

## 2020-04-17 DIAGNOSIS — F32.81 PMDD (PREMENSTRUAL DYSPHORIC DISORDER): Primary | ICD-10-CM

## 2020-04-17 DIAGNOSIS — L70.0 ACNE VULGARIS: ICD-10-CM

## 2020-04-17 PROCEDURE — 99213 OFFICE O/P EST LOW 20 MIN: CPT | Performed by: FAMILY MEDICINE

## 2020-04-17 ASSESSMENT — PAIN SCALES - GENERAL: PAINLEVEL: NO PAIN (0)

## 2020-04-17 NOTE — NURSING NOTE
Patient would like to discuss birth control  Med rec complete.   Marcelle Arevalo LPN.................. 4/17/2020 12:36 PM

## 2020-04-17 NOTE — PROGRESS NOTES
"Subjective     Jailene Mccoy is a 39 year old female who is being evaluated via a billable telephone visit.      The patient has been notified of following:     \"This telephone visit will be conducted via a call between you and your physician/provider. We have found that certain health care needs can be provided without the need for a physical exam.  This service lets us provide the care you need with a short phone conversation.  If a prescription is necessary we can send it directly to your pharmacy.  If lab work is needed we can place an order for that and you can then stop by our lab to have the test done at a later time.    If during the course of the call the physician/provider feels a telephone visit is not appropriate, you will not be charged for this service.\"     Patient has given verbal consent for Telephone visit?  Yes    Jailene Mccoy complains of   Chief Complaint   Patient presents with     Contraception       Jailene Mccoy is assessed via telephone visit with the following concerns:    Acne - would like to start on oral contraceptive pills for this.  However, the real reason she'd like to start is PMS/mood symptoms.  She is also feeling more hormonal symptoms.  Her cramps are getting worse now too.  Her periods are not heavier - she has 3-4 days of bleeding.  She has taken over the counter medications for her cramps.    She was on oral contraceptive pills many years ago.  She states that she started on this age 17 for PMS symptoms.  She is feeling that way again.  The past 4-5 years she's been super busy, attributed her \"crabbiness\" and moods to being busy.  The past 1-2 years as things have been less busy.  The week before her period she will get fairchild and cry for no reason.  This continues during her period then starts to let up.  Her  can tell when she's going to get her symptoms due to her moods.    No migraines.  Doesn't smoke.  No VTE personal/family history.     LMP - " 3/31/2020.      ALLERGIES  Patient has no known allergies.    Current Outpatient Medications   Medication     ADDERALL XR 30 MG 24 hr capsule     amphetamine-dextroamphetamine (ADDERALL) 30 MG tablet     norgestrel-ethinyl estradiol (LO/OVRAL) 0.3-30 MG-MCG tablet     valACYclovir (VALTREX) 500 MG tablet     No current facility-administered medications for this visit.        Past Medical History:   Diagnosis Date     Attention-deficit hyperactivity disorder     teen onset/meds thru [facility]     Personal history of other medical treatment (CODE)     G2Para 2-0-0-2       Social History     Socioeconomic History     Marital status:      Spouse name: Adilson     Number of children: 2     Years of education: 16     Highest education level: Not on file   Occupational History     Employer: [employer]   Social Needs     Financial resource strain: Not on file     Food insecurity     Worry: Not on file     Inability: Not on file     Transportation needs     Medical: Not on file     Non-medical: Not on file   Tobacco Use     Smoking status: Never Smoker     Smokeless tobacco: Never Used   Substance and Sexual Activity     Alcohol use: Yes     Comment:  occasionally     Drug use: No     Sexual activity: Yes     Partners: Male     Birth control/protection: Female Surgical     Comment: TL   Lifestyle     Physical activity     Days per week: Not on file     Minutes per session: Not on file     Stress: Not on file   Relationships     Social connections     Talks on phone: Not on file     Gets together: Not on file     Attends Church service: Not on file     Active member of club or organization: Not on file     Attends meetings of clubs or organizations: Not on file     Relationship status: Not on file     Intimate partner violence     Fear of current or ex partner: Not on file     Emotionally abused: Not on file     Physically abused: Not on file     Forced sexual activity: Not on file   Other Topics Concern     Parent/sibling w/  CABG, MI or angioplasty before 65F 55M? Not Asked   Social History Narrative    Elementary education degree, in Masters program CSS 2015.  [employer] teacher at [employer]    Children:  Yoselyn Griffith       2011           Reviewed and updated as needed this visit by Provider         Review of Systems   States her acne is pretty well controlled at this time.  Pap smear is due.    PHQ 5/20/2015   PHQ-9 Total Score 0   Q9: Thoughts of better off dead/self-harm past 2 weeks Not at all     BAYLEE-7 SCORE 5/20/2015 8/26/2015   Total Score 3 1            Objective   Reported vitals:  LMP 03/31/2020    healthy, alert and no distress  Psych: Alert and oriented times 3; coherent speech, normal   rate and volume, able to articulate logical thoughts, able   to abstract reason, no tangential thoughts, no hallucinations   or delusions  Her affect is normal over the phone.       Diagnostic Test Results:  Labs reviewed in Epic        Assessment/Plan:    ICD-10-CM    1. PMDD (premenstrual dysphoric disorder)  F32.81 norgestrel-ethinyl estradiol (LO/OVRAL) 0.3-30 MG-MCG tablet   2. Acne vulgaris  L70.0      1.  Patient with no known contraindications to use of oral contraceptives.  Her last blood pressure obtained in clinic was normal.  2.  She wishes to first try oral contraceptives for mood disorder.  She states she took these when she was around 17 years of age with good response.  She is not aware of side effects that she had at that time.  Other options could include SSRIs, be that continual dosing or intermittent dosing.  At this time, she will be started on lo/ovral 1 tablet daily.  Potential side effects of this medication are discussed and reviewed.  In particular, discussed if her mood worsens, she should discontinue this and contact clinic.  3.  Patient is due for Pap smear/healthcare maintenance exam and should schedule that for this summer.      Phone call duration:  15 minutes    Kayla Maradiaga MD

## 2020-09-14 DIAGNOSIS — F32.81 PMDD (PREMENSTRUAL DYSPHORIC DISORDER): ICD-10-CM

## 2020-09-16 RX ORDER — NORGESTREL AND ETHINYL ESTRADIOL 0.3-0.03MG
KIT ORAL
Qty: 84 TABLET | Refills: 0 | Status: SHIPPED | OUTPATIENT
Start: 2020-09-16 | End: 2020-11-27

## 2020-09-16 NOTE — TELEPHONE ENCOUNTER
Kierra ORTIZ sent Rx request for the following:      LOW-OGESTREL TABLETS 28  Sig: TAKE 1 TABLET BY MOUTH DAILY       Last Prescription Date:   4/17/2020  Last Fill Qty/Refills:         84, R-1    Last Office Visit:              4/17/2020   Future Office visit:           none    Prescription refilled per RN Medication Refill Policy.................... Pablo Garrido RN ....................  9/16/2020   10:42 AM

## 2020-11-26 DIAGNOSIS — F32.81 PMDD (PREMENSTRUAL DYSPHORIC DISORDER): ICD-10-CM

## 2020-11-27 RX ORDER — NORGESTREL-ETHINYL ESTRADIOL 0.3-0.03MG
TABLET ORAL
Qty: 84 TABLET | Refills: 0 | Status: SHIPPED | OUTPATIENT
Start: 2020-11-27 | End: 2021-02-08

## 2020-12-27 ENCOUNTER — HEALTH MAINTENANCE LETTER (OUTPATIENT)
Age: 40
End: 2020-12-27

## 2021-02-08 ENCOUNTER — OFFICE VISIT (OUTPATIENT)
Dept: FAMILY MEDICINE | Facility: OTHER | Age: 41
End: 2021-02-08
Attending: FAMILY MEDICINE
Payer: COMMERCIAL

## 2021-02-08 VITALS
BODY MASS INDEX: 20.25 KG/M2 | RESPIRATION RATE: 14 BRPM | TEMPERATURE: 97 F | DIASTOLIC BLOOD PRESSURE: 98 MMHG | SYSTOLIC BLOOD PRESSURE: 142 MMHG | HEART RATE: 87 BPM | HEIGHT: 67 IN | OXYGEN SATURATION: 98 % | WEIGHT: 129 LBS

## 2021-02-08 DIAGNOSIS — Z87.19 H/O HERNIA REPAIR: Primary | ICD-10-CM

## 2021-02-08 DIAGNOSIS — Z12.31 ENCOUNTER FOR SCREENING MAMMOGRAM FOR BREAST CANCER: ICD-10-CM

## 2021-02-08 DIAGNOSIS — R03.0 ELEVATED BLOOD PRESSURE READING WITHOUT DIAGNOSIS OF HYPERTENSION: ICD-10-CM

## 2021-02-08 DIAGNOSIS — Z98.890 H/O HERNIA REPAIR: Primary | ICD-10-CM

## 2021-02-08 PROCEDURE — 99214 OFFICE O/P EST MOD 30 MIN: CPT | Performed by: FAMILY MEDICINE

## 2021-02-08 ASSESSMENT — MIFFLIN-ST. JEOR: SCORE: 1282.77

## 2021-02-08 ASSESSMENT — PAIN SCALES - GENERAL: PAINLEVEL: NO PAIN (0)

## 2021-02-08 NOTE — NURSING NOTE
Patient presents to the clinic today to have a hernia checked.   Med rec complete.  Marcelle Arevalo LPN.................. 2/8/2021 3:49 PM

## 2021-02-08 NOTE — PROGRESS NOTES
Nursing Notes:   Marcelle Arevalo LPN  2021  3:54 PM  Signed  Patient presents to the clinic today to have a hernia checked.   Med rec complete.  Marcelle Arevalo LPN.................. 2021 3:49 PM        SUBJECTIVE:   CC:  Jailene Mccoy is a 40 year old female who presents to clinic today for the following health issues: Previous hernia check    HPI  Jailene Mccoy is a 40 year old female who presents for previous hernia check.  In 2018, she underwent repair of epigastric ventral hernia.  Right after she had her surgery, she felt like nothing had changed.  About a month ago, it looked     When she goes to see Dr Fu to have her medications checked, she gets her BP checked but her visits have been virtual thru this past year however.       No Known Allergies  Current Outpatient Medications   Medication     ADDERALL XR 30 MG 24 hr capsule     amphetamine-dextroamphetamine (ADDERALL) 30 MG tablet     valACYclovir (VALTREX) 500 MG tablet     No current facility-administered medications for this visit.       Past Medical History:   Diagnosis Date     Attention-deficit hyperactivity disorder     teen onset/meds thru [facility]     Personal history of other medical treatment (CODE)     G2Para 2-0-0-2      Past Surgical History:   Procedure Laterality Date     BUNIONECTOMY      3-,left      SECTION      ,breech presentation     [facility] surgery  2008     HERNIORRHAPHY VENTRAL N/A 2018    Epigastric Ventral Hernia Repair & Laparoscopic Tubal Ligation;  Surgeon: Bobby Jones MD;  Location:  OR     LAPAROSCOPIC TUBAL LIGATION Bilateral 2018    Procedure: LAPAROSCOPIC TUBAL LIGATION;;  Surgeon: Ike Krishnan MD;  Location:  OR     wisdom teeth         Review of Systems     PHQ-2 Score:     PHQ-2 (  [employer]) 2021   Q1: Little interest or pleasure in doing things 0 0   Q2: Feeling down, depressed or hopeless 0 0   PHQ-2 Score 0 0         PHQ-9 SCORE  "5/20/2015   PHQ-9 Total Score 0     BAYLEE-7 SCORE 5/20/2015 8/26/2015   Total Score 3 1             OBJECTIVE:     BP (!) 142/98   Pulse 87   Temp 97  F (36.1  C) (Tympanic)   Resp 14   Ht 1.702 m (5' 7\")   Wt 58.5 kg (129 lb)   LMP 01/13/2021   SpO2 98%   Breastfeeding No   BMI 20.20 kg/m    Wt Readings from Last 3 Encounters:   02/08/21 58.5 kg (129 lb)   11/18/19 59 kg (130 lb)   09/11/19 59.1 kg (130 lb 4 oz)       Body mass index is 20.2 kg/m .  Physical Exam  Vitals signs and nursing note reviewed.   Constitutional:       Appearance: Normal appearance.   Cardiovascular:      Rate and Rhythm: Normal rate and regular rhythm.   Abdominal:      Comments: Superior to her umbilicus is a palpable defect and to the left of this, a palpable ridge.  This is not tender to touch.   Musculoskeletal:      Right lower leg: No edema.      Left lower leg: No edema.   Neurological:      Mental Status: She is alert.            No results found for any visits on 02/08/21.      ASSESSMENT/PLAN:     1. H/O hernia repair    2. Encounter for screening mammogram for breast cancer    3. Elevated blood pressure reading without diagnosis of hypertension        Assessment & Plan   Problem List Items Addressed This Visit     None      Visit Diagnoses     H/O hernia repair    -  Primary    Relevant Orders    CT Abdomen Pelvis w/o Contrast    Encounter for screening mammogram for breast cancer        Relevant Orders    MA Screen Bilateral w/Michael    Elevated blood pressure reading without diagnosis of hypertension        Relevant Orders    CT Abdomen Pelvis w/o Contrast              1.  On physical exam, possible what patient is feeling is rectus diastases versus recurrent hernia.  Patient would like to undergo imaging to see which of these is the case.  She does have current hernia, referral to general surgery recommended.  2.  Discussed her blood pressure findings today.  This could be side effect of her Adderall.  She was asked " about alcohol consumption yesterday, Super Bowl Sunday.  She is able to get her blood pressure rechecked at school/work.  If remains above 140/90, additional work-up for hypertension recommended.  3.  Healthcare maintenance reviewed.  Mammogram is ordered.  She is due for Pap smear/HPV testing.  DAISY VAZ MD  [facility]    This note was created using voice recognition software and was screened for errors in transcription.

## 2021-02-08 NOTE — PATIENT INSTRUCTIONS
Your blood pressure today is 142/98.  Normal is less than 140/90.  Possible causes:  Medication side effects from adderall.    Don't take your medication tomorrow.  Have the school nurse check your blood pressure a few times.

## 2021-02-09 SDOH — ECONOMIC STABILITY: INCOME INSECURITY: HOW HARD IS IT FOR YOU TO PAY FOR THE VERY BASICS LIKE FOOD, HOUSING, MEDICAL CARE, AND HEATING?: NOT HARD AT ALL

## 2021-02-09 SDOH — ECONOMIC STABILITY: FOOD INSECURITY: WITHIN THE PAST 12 MONTHS, YOU WORRIED THAT YOUR FOOD WOULD RUN OUT BEFORE YOU GOT MONEY TO BUY MORE.: NEVER TRUE

## 2021-02-09 SDOH — ECONOMIC STABILITY: TRANSPORTATION INSECURITY
IN THE PAST 12 MONTHS, HAS LACK OF TRANSPORTATION KEPT YOU FROM MEETINGS, WORK, OR FROM GETTING THINGS NEEDED FOR DAILY LIVING?: NO

## 2021-02-09 SDOH — ECONOMIC STABILITY: TRANSPORTATION INSECURITY
IN THE PAST 12 MONTHS, HAS THE LACK OF TRANSPORTATION KEPT YOU FROM MEDICAL APPOINTMENTS OR FROM GETTING MEDICATIONS?: NO

## 2021-02-09 SDOH — ECONOMIC STABILITY: FOOD INSECURITY: WITHIN THE PAST 12 MONTHS, THE FOOD YOU BOUGHT JUST DIDN'T LAST AND YOU DIDN'T HAVE MONEY TO GET MORE.: NOT ASKED

## 2021-02-12 ENCOUNTER — TELEPHONE (OUTPATIENT)
Dept: FAMILY MEDICINE | Facility: OTHER | Age: 41
End: 2021-02-12

## 2021-02-12 DIAGNOSIS — R03.0 ELEVATED BLOOD PRESSURE READING WITHOUT DIAGNOSIS OF HYPERTENSION: Primary | ICD-10-CM

## 2021-02-12 NOTE — TELEPHONE ENCOUNTER
Patient informed she can schedule a lab appointment. She was transferred to scheduling to schedule.     Lizette Castillo CMA on 2/12/2021 at 1:46 PM

## 2021-02-12 NOTE — TELEPHONE ENCOUNTER
Patient states it does not matter if she has her medications in her or not- her blood pressure is still elevated. Readings have been anywhere between 165/110 to 175/113. Pulse has been in the 90's. She has an appointment Monday to have a CT scan and mammogram. She is wondering if she should have labs checked? She mentioned a thyroid test.     Lizette Castillo, VALERIE on 2/12/2021 at 10:02 AM

## 2021-02-12 NOTE — TELEPHONE ENCOUNTER
Patient is calling for a follow up she was suppose to track her BP. She is just calling to give an update.

## 2021-02-15 ENCOUNTER — HOSPITAL ENCOUNTER (OUTPATIENT)
Dept: MAMMOGRAPHY | Facility: OTHER | Age: 41
End: 2021-02-15
Attending: FAMILY MEDICINE
Payer: COMMERCIAL

## 2021-02-15 ENCOUNTER — HOSPITAL ENCOUNTER (OUTPATIENT)
Dept: CT IMAGING | Facility: OTHER | Age: 41
End: 2021-02-15
Attending: FAMILY MEDICINE
Payer: COMMERCIAL

## 2021-02-15 DIAGNOSIS — Z98.890 H/O HERNIA REPAIR: ICD-10-CM

## 2021-02-15 DIAGNOSIS — Z12.31 ENCOUNTER FOR SCREENING MAMMOGRAM FOR BREAST CANCER: ICD-10-CM

## 2021-02-15 DIAGNOSIS — R92.8 ABNORMAL FINDING ON BREAST IMAGING: ICD-10-CM

## 2021-02-15 DIAGNOSIS — Z87.19 H/O HERNIA REPAIR: ICD-10-CM

## 2021-02-15 DIAGNOSIS — R03.0 ELEVATED BLOOD PRESSURE READING WITHOUT DIAGNOSIS OF HYPERTENSION: ICD-10-CM

## 2021-02-15 LAB
ALBUMIN SERPL-MCNC: 4.4 G/DL (ref 3.5–5.7)
ALP SERPL-CCNC: 43 U/L (ref 34–104)
ALT SERPL W P-5'-P-CCNC: 15 U/L (ref 7–52)
ANION GAP SERPL CALCULATED.3IONS-SCNC: 8 MMOL/L (ref 3–14)
AST SERPL W P-5'-P-CCNC: 20 U/L (ref 13–39)
BILIRUB SERPL-MCNC: 0.4 MG/DL (ref 0.3–1)
BUN SERPL-MCNC: 20 MG/DL (ref 7–25)
CALCIUM SERPL-MCNC: 9.5 MG/DL (ref 8.6–10.3)
CHLORIDE SERPL-SCNC: 103 MMOL/L (ref 98–107)
CO2 SERPL-SCNC: 29 MMOL/L (ref 21–31)
CREAT SERPL-MCNC: 0.98 MG/DL (ref 0.6–1.2)
ERYTHROCYTE [DISTWIDTH] IN BLOOD BY AUTOMATED COUNT: 12.1 % (ref 10–15)
GFR SERPL CREATININE-BSD FRML MDRD: 63 ML/MIN/{1.73_M2}
GLUCOSE SERPL-MCNC: 102 MG/DL (ref 70–105)
HCT VFR BLD AUTO: 42.9 % (ref 35–47)
HGB BLD-MCNC: 14.1 G/DL (ref 11.7–15.7)
MCH RBC QN AUTO: 30.6 PG (ref 26.5–33)
MCHC RBC AUTO-ENTMCNC: 32.9 G/DL (ref 31.5–36.5)
MCV RBC AUTO: 93 FL (ref 78–100)
PLATELET # BLD AUTO: 268 10E9/L (ref 150–450)
POTASSIUM SERPL-SCNC: 3.7 MMOL/L (ref 3.5–5.1)
PROT SERPL-MCNC: 8 G/DL (ref 6.4–8.9)
RBC # BLD AUTO: 4.61 10E12/L (ref 3.8–5.2)
SODIUM SERPL-SCNC: 140 MMOL/L (ref 134–144)
TSH SERPL DL<=0.05 MIU/L-ACNC: 1.19 IU/ML (ref 0.34–5.6)
WBC # BLD AUTO: 6.2 10E9/L (ref 4–11)

## 2021-02-15 PROCEDURE — 84244 ASSAY OF RENIN: CPT | Mod: ZL | Performed by: FAMILY MEDICINE

## 2021-02-15 PROCEDURE — 255N000002 HC RX 255 OP 636: Performed by: FAMILY MEDICINE

## 2021-02-15 PROCEDURE — 74177 CT ABD & PELVIS W/CONTRAST: CPT

## 2021-02-15 PROCEDURE — G0279 TOMOSYNTHESIS, MAMMO: HCPCS

## 2021-02-15 PROCEDURE — 85027 COMPLETE CBC AUTOMATED: CPT | Mod: ZL | Performed by: FAMILY MEDICINE

## 2021-02-15 PROCEDURE — 77063 BREAST TOMOSYNTHESIS BI: CPT

## 2021-02-15 PROCEDURE — 36415 COLL VENOUS BLD VENIPUNCTURE: CPT | Mod: ZL | Performed by: FAMILY MEDICINE

## 2021-02-15 PROCEDURE — 80053 COMPREHEN METABOLIC PANEL: CPT | Mod: ZL | Performed by: FAMILY MEDICINE

## 2021-02-15 PROCEDURE — 84443 ASSAY THYROID STIM HORMONE: CPT | Mod: ZL | Performed by: FAMILY MEDICINE

## 2021-02-15 RX ADMIN — IOHEXOL 100 ML: 350 INJECTION, SOLUTION INTRAVENOUS at 15:56

## 2021-02-17 ENCOUNTER — TELEPHONE (OUTPATIENT)
Dept: FAMILY MEDICINE | Facility: OTHER | Age: 41
End: 2021-02-17

## 2021-02-17 NOTE — TELEPHONE ENCOUNTER
Reason for call: Request for results.    Name of test or procedure: CT scan and blood work     Date of test or procedure: 2/15    Location of test or procedure: [facility]    Preferred method for responding to this message: Telephone Call    Phone number patient can be reached at: Cell number on file:    Telephone Information:   Mobile [phone]       If we can't reach you directly, may we leave a detailed response at the number you provided?Yes

## 2021-02-18 LAB — RENIN PLAS-CCNC: 0.1 NG/ML/HR

## 2021-02-22 PROBLEM — D18.03 HEPATIC HEMANGIOMA: Status: ACTIVE | Noted: 2021-02-17

## 2021-03-03 ENCOUNTER — E-VISIT (OUTPATIENT)
Dept: URGENT CARE | Facility: URGENT CARE | Age: 41
End: 2021-03-03
Payer: COMMERCIAL

## 2021-03-03 DIAGNOSIS — B37.31 YEAST INFECTION OF THE VAGINA: Primary | ICD-10-CM

## 2021-03-03 DIAGNOSIS — B37.31 CANDIDAL VULVOVAGINITIS: ICD-10-CM

## 2021-03-03 DIAGNOSIS — F32.81 PMDD (PREMENSTRUAL DYSPHORIC DISORDER): ICD-10-CM

## 2021-03-03 PROCEDURE — 99421 OL DIG E/M SVC 5-10 MIN: CPT | Performed by: FAMILY MEDICINE

## 2021-03-03 NOTE — TELEPHONE ENCOUNTER
Call made to patient regarding Rx need, left message to return call. Unable to complete prescription refill per RN Medication Refill Policy.................... Nasreen Rowell RN ....................  3/3/2021   11:01 AM

## 2021-03-03 NOTE — TELEPHONE ENCOUNTER
Call returned to patient. Verified name and . Patient stopped for about a month, got period for 7 day straight. Wishes to restart medication to avoid prolonged menorrhea.     Kierra ORTIZ sent Rx request for the following:   norgestrel-ethinyl estradiol (CRYSELLE-28) 0.3-30 MG-MCG tablet  Sig:Take 1 tablet by mouth daily    Last Prescription Date:     Last Fill Qty/Refills:         84, R-0    Last Office Visit:              2021 (Meng Olvera)   Future Office visit:           None noted    This Order Has Been Discontinued  Order Status Reason By On   Discontinued None Marcelle Arevalo, LPN [id number]     Will route for provider review in absence of PCP.  Unable to complete prescription refill per RN Medication Refill Policy.................... Nasreen Rowell RN ....................  3/3/2021   5:03 PM

## 2021-03-03 NOTE — TELEPHONE ENCOUNTER
Received refill request from Kierra from [facility] take one tablet by mouth daily.    Medication not on current medication list.  Noted as discontinue on 2//2021    Laura Freedman RN on 3/3/2021 at 10:50 AM

## 2021-03-04 RX ORDER — FLUCONAZOLE 150 MG/1
150 TABLET ORAL WEEKLY
Qty: 2 TABLET | Refills: 0 | Status: SHIPPED | OUTPATIENT
Start: 2021-03-04 | End: 2021-03-09

## 2021-03-04 RX ORDER — NORGESTREL-ETHINYL ESTRADIOL 0.3-0.03MG
1 TABLET ORAL DAILY
Qty: 28 TABLET | Refills: 0 | Status: SHIPPED | OUTPATIENT
Start: 2021-03-04 | End: 2021-03-04

## 2021-03-04 RX ORDER — NORGESTREL-ETHINYL ESTRADIOL 0.3-0.03MG
1 TABLET ORAL DAILY
Qty: 84 TABLET | Refills: 0 | Status: SHIPPED | OUTPATIENT
Start: 2021-03-04 | End: 2021-05-12

## 2021-03-04 NOTE — PATIENT INSTRUCTIONS
Thank you for choosing us for your care. I have placed an order for a prescription so that you can start treatment. View your full visit summary for details by clicking on the link below. Your pharmacist will able to address any questions you may have about the medication.     If you re not feeling better within 2-3 days, please schedule an appointment.  You can schedule an appointment right here in [place], or call [phone]  If the visit is for the same symptoms as your eVisit, we ll refund the cost of your eVisit if seen within seven days.      Yeast Infection (Candida Vaginal Infection)    You have a Candida vaginal infection. This is also known as a yeast infection. It's most often caused by a type of yeast (fungus) called Candida. Candida are normally found in the vagina. But if they increase in number, this can lead to infection and cause symptoms.   Symptoms of a yeast infection can include:     Clumpy or thin, white discharge, which may look like cottage cheese    Itching or burning    Burning with urination  Certain factors can make a yeast infection more likely. These can include:     Taking certain medicines, such as antibiotics or birth control pills    Pregnancy    Diabetes    Weak immune system  A yeast infection is most often treated with antifungal medicine. This may be given as a vaginal cream or pills you take by mouth. Treatment may last for about 1 to 7 days. Women with severe or recurrent infections may need longer courses of treatment.   Home care    If you re prescribed medicine, be sure to use it as directed. Finish all of the medicine, even if your symptoms go away. Don t try to treat yourself using over-the-counter products without talking with your provider first. They will let you know if this is a good option for you.    Ask your provider what steps you can take to help reduce your risk of having a yeast infection in the future.    Follow-up care  Follow up with your healthcare  provider, or as directed.   When to seek medical advice  Call your healthcare provider right away if:     You have a fever of 100.4 F (38 C) or higher, or as directed by your provider.    Your symptoms worsen, or they don t go away within a few days of starting treatment.    You have new pain in the lower belly or pelvic region.    You have side effects that bother you or a reaction to the cream or pills you re prescribed.    You or any partners you have sex with have new symptoms, such as a rash, joint pain, or sores.  [employer] last reviewed this educational content on 7/1/2020 2000-2020 [employer]. All rights reserved. This information is not intended as a substitute for professional medical care. Always follow your healthcare professional's instructions.

## 2021-03-04 NOTE — TELEPHONE ENCOUNTER
Refilled medication for 3 months.  Needs to have a physical with Pap test prior to future refills.  Ariana Nogueira PA-C.......... 3/4/2021 8:37 AM

## 2021-03-09 ENCOUNTER — NURSE TRIAGE (OUTPATIENT)
Dept: FAMILY MEDICINE | Facility: OTHER | Age: 41
End: 2021-03-09

## 2021-03-09 ENCOUNTER — OFFICE VISIT (OUTPATIENT)
Dept: FAMILY MEDICINE | Facility: OTHER | Age: 41
End: 2021-03-09
Attending: PHYSICIAN ASSISTANT
Payer: COMMERCIAL

## 2021-03-09 ENCOUNTER — TELEPHONE (OUTPATIENT)
Dept: FAMILY MEDICINE | Facility: OTHER | Age: 41
End: 2021-03-09

## 2021-03-09 VITALS
DIASTOLIC BLOOD PRESSURE: 100 MMHG | OXYGEN SATURATION: 99 % | TEMPERATURE: 97.5 F | HEART RATE: 87 BPM | HEIGHT: 67 IN | RESPIRATION RATE: 16 BRPM | WEIGHT: 133 LBS | BODY MASS INDEX: 20.88 KG/M2 | SYSTOLIC BLOOD PRESSURE: 192 MMHG

## 2021-03-09 DIAGNOSIS — R03.0 ELEVATED BLOOD PRESSURE READING: Primary | ICD-10-CM

## 2021-03-09 DIAGNOSIS — R03.0 ELEVATED BLOOD PRESSURE READING: ICD-10-CM

## 2021-03-09 LAB
ALBUMIN SERPL-MCNC: 4.1 G/DL (ref 3.5–5.7)
ALBUMIN UR-MCNC: NEGATIVE MG/DL
ALP SERPL-CCNC: 33 U/L (ref 34–104)
ALT SERPL W P-5'-P-CCNC: 14 U/L (ref 7–52)
ANION GAP SERPL CALCULATED.3IONS-SCNC: 9 MMOL/L (ref 3–14)
APPEARANCE UR: CLEAR
AST SERPL W P-5'-P-CCNC: 19 U/L (ref 13–39)
BILIRUB SERPL-MCNC: 0.5 MG/DL (ref 0.3–1)
BILIRUB UR QL STRIP: NEGATIVE
BUN SERPL-MCNC: 23 MG/DL (ref 7–25)
CALCIUM SERPL-MCNC: 9.2 MG/DL (ref 8.6–10.3)
CHLORIDE SERPL-SCNC: 104 MMOL/L (ref 98–107)
CHOLEST SERPL-MCNC: 221 MG/DL
CO2 SERPL-SCNC: 25 MMOL/L (ref 21–31)
COLOR UR AUTO: NORMAL
CREAT SERPL-MCNC: 0.94 MG/DL (ref 0.6–1.2)
ERYTHROCYTE [DISTWIDTH] IN BLOOD BY AUTOMATED COUNT: 12.6 % (ref 10–15)
GFR SERPL CREATININE-BSD FRML MDRD: 66 ML/MIN/{1.73_M2}
GLUCOSE SERPL-MCNC: 127 MG/DL (ref 70–105)
GLUCOSE UR STRIP-MCNC: NEGATIVE MG/DL
HCT VFR BLD AUTO: 36.5 % (ref 35–47)
HDLC SERPL-MCNC: 115 MG/DL (ref 23–92)
HGB BLD-MCNC: 12.2 G/DL (ref 11.7–15.7)
HGB UR QL STRIP: NEGATIVE
KETONES UR STRIP-MCNC: NEGATIVE MG/DL
LDLC SERPL CALC-MCNC: 72 MG/DL
LEUKOCYTE ESTERASE UR QL STRIP: NEGATIVE
MCH RBC QN AUTO: 31.2 PG (ref 26.5–33)
MCHC RBC AUTO-ENTMCNC: 33.4 G/DL (ref 31.5–36.5)
MCV RBC AUTO: 93 FL (ref 78–100)
NITRATE UR QL: NEGATIVE
NONHDLC SERPL-MCNC: 106 MG/DL
PH UR STRIP: 6 PH (ref 5–7)
PLATELET # BLD AUTO: 261 10E9/L (ref 150–450)
POTASSIUM SERPL-SCNC: 3.6 MMOL/L (ref 3.5–5.1)
PROT SERPL-MCNC: 7.1 G/DL (ref 6.4–8.9)
RBC # BLD AUTO: 3.91 10E12/L (ref 3.8–5.2)
SODIUM SERPL-SCNC: 138 MMOL/L (ref 134–144)
SOURCE: NORMAL
SP GR UR STRIP: 1.02 (ref 1–1.03)
TRIGL SERPL-MCNC: 172 MG/DL
TSH SERPL DL<=0.05 MIU/L-ACNC: 0.62 IU/ML (ref 0.34–5.6)
UROBILINOGEN UR STRIP-MCNC: NORMAL MG/DL (ref 0–2)
WBC # BLD AUTO: 7 10E9/L (ref 4–11)

## 2021-03-09 PROCEDURE — 99214 OFFICE O/P EST MOD 30 MIN: CPT | Performed by: PHYSICIAN ASSISTANT

## 2021-03-09 PROCEDURE — 80061 LIPID PANEL: CPT | Mod: ZL | Performed by: PHYSICIAN ASSISTANT

## 2021-03-09 PROCEDURE — 84443 ASSAY THYROID STIM HORMONE: CPT | Mod: ZL | Performed by: PHYSICIAN ASSISTANT

## 2021-03-09 PROCEDURE — 85027 COMPLETE CBC AUTOMATED: CPT | Mod: ZL | Performed by: PHYSICIAN ASSISTANT

## 2021-03-09 PROCEDURE — 36415 COLL VENOUS BLD VENIPUNCTURE: CPT | Mod: ZL | Performed by: PHYSICIAN ASSISTANT

## 2021-03-09 PROCEDURE — 80053 COMPREHEN METABOLIC PANEL: CPT | Mod: ZL | Performed by: PHYSICIAN ASSISTANT

## 2021-03-09 PROCEDURE — 81003 URINALYSIS AUTO W/O SCOPE: CPT | Mod: ZL | Performed by: PHYSICIAN ASSISTANT

## 2021-03-09 RX ORDER — LISINOPRIL 10 MG/1
10 TABLET ORAL DAILY
Qty: 30 TABLET | Refills: 1 | Status: SHIPPED | OUTPATIENT
Start: 2021-03-09 | End: 2021-04-20

## 2021-03-09 ASSESSMENT — ANXIETY QUESTIONNAIRES
1. FEELING NERVOUS, ANXIOUS, OR ON EDGE: NOT AT ALL
2. NOT BEING ABLE TO STOP OR CONTROL WORRYING: NOT AT ALL
GAD7 TOTAL SCORE: 0
6. BECOMING EASILY ANNOYED OR IRRITABLE: NOT AT ALL
7. FEELING AFRAID AS IF SOMETHING AWFUL MIGHT HAPPEN: NOT AT ALL
3. WORRYING TOO MUCH ABOUT DIFFERENT THINGS: NOT AT ALL
5. BEING SO RESTLESS THAT IT IS HARD TO SIT STILL: NOT AT ALL
IF YOU CHECKED OFF ANY PROBLEMS ON THIS QUESTIONNAIRE, HOW DIFFICULT HAVE THESE PROBLEMS MADE IT FOR YOU TO DO YOUR WORK, TAKE CARE OF THINGS AT HOME, OR GET ALONG WITH OTHER PEOPLE: NOT DIFFICULT AT ALL

## 2021-03-09 ASSESSMENT — PATIENT HEALTH QUESTIONNAIRE - PHQ9
5. POOR APPETITE OR OVEREATING: NOT AT ALL
SUM OF ALL RESPONSES TO PHQ QUESTIONS 1-9: 0

## 2021-03-09 ASSESSMENT — PAIN SCALES - GENERAL: PAINLEVEL: NO PAIN (0)

## 2021-03-09 ASSESSMENT — MIFFLIN-ST. JEOR: SCORE: 1300.91

## 2021-03-09 NOTE — TELEPHONE ENCOUNTER
Patient is currently seeing [facility] for elevated blood pressure.  Juana Rodriguez LPN ....................  3/9/2021   3:03 PM

## 2021-03-09 NOTE — PROGRESS NOTES
Assessment & Plan     1. Elevated blood pressure reading  BP is elevated in clinic today, otherwise vitals are reassuring. Unfortunately, left clinic prior to BP recheck. Exam is unremarkable. Given she is asymptomatic, completed workup as outlined below. Results pending. Will start on lisinopril 10 mg initially. Educated on medication, use, and side effects. Continue monitoring BP daily. If not noticing any improvement, can try increasing to 20 mg once daily. Discussed healthy diet and exercise. Follow up in 4 weeks for recheck or sooner if needed. Gave warning signs and symptoms for need to be seen in clinic or ED sooner.   - CBC W PLT No Diff; Future  - Comprehensive Metabolic Panel; Future  - UA reflex to Microscopic and Culture  - TSH Reflex [facility]; Future  - Lipid Panel; Future  - lisinopril (ZESTRIL) 10 MG tablet; Take 1 tablet (10 mg) by mouth daily  Dispense: 30 tablet; Refill: 1  - Lipid Panel  - TSH Reflex [facility]  - Comprehensive Metabolic Panel  - CBC W PLT No Diff      Return in about 4 weeks (around 4/6/2021), or if symptoms worsen or fail to improve.    Carmela Glasgow PA-C  [facility] is a 40 year old who presents for the following health issues     HPI   Here for evaluation of blood pressure concerns. Has been noted to be elevated in the clinic previously however medications were not started at that time. Has been checking BP once daily at work and is averaging 160-170/110. Does note she has been struggling with significantly increased stress and anxiety and work due to COVID changes and difficulties with her class as well as some stress at home. Notes she does not consume a healthy diet, high in salt. Denies associated chest pain or pressure, palpitatoins, dizziness, lightheadedness, syncope, headaches, vision changes, shortness of breath. No history of hypertension, cardiac or pulmonary disease.       PAST MEDICAL HISTORY:   Past Medical History:  "  Diagnosis Date     Attention-deficit hyperactivity disorder     teen onset/meds thru [facility]     Hepatic hemangioma 2021     Personal history of other medical treatment (CODE)     G2Para 2-0-0-2       PAST SURGICAL HISTORY:   Past Surgical History:   Procedure Laterality Date     BUNIONECTOMY      3-,left      SECTION      ,breech presentation     chin surgery  2008     HERNIORRHAPHY VENTRAL N/A 2018    Epigastric Ventral Hernia Repair & Laparoscopic Tubal Ligation;  Surgeon: Bobby Jones MD;  Location: [facility] OR     LAPAROSCOPIC TUBAL LIGATION Bilateral 2018    Procedure: LAPAROSCOPIC TUBAL LIGATION;;  Surgeon: Ike Krishnan MD;  Location: [facility] OR     wisdom teeth         FAMILY HISTORY:   Family History   Problem Relation Age of Onset     Attention Deficit Disorder Sister      Breast Cancer Maternal Grandmother         Cancer-breast,and lung cancer     Other - See Comments Mother         ADD     Hypertension Paternal Grandmother        SOCIAL HISTORY:   Social History     Tobacco Use     Smoking status: Never Smoker     Smokeless tobacco: Never Used   Substance Use Topics     Alcohol use: Yes     Comment:  occasionally      No Known Allergies  Current Outpatient Medications   Medication     ADDERALL XR 30 MG 24 hr capsule     amphetamine-dextroamphetamine (ADDERALL) 30 MG tablet     lisinopril (ZESTRIL) 10 MG tablet     norgestrel-ethinyl estradiol (CRYSELLE-28) 0.3-30 MG-MCG tablet     valACYclovir (VALTREX) 500 MG tablet     No current facility-administered medications for this visit.          Review of Systems   Per HPI.       Objective    BP (!) 192/100   Pulse 87   Temp 97.5  F (36.4  C)   Resp 16   Ht 1.702 m (5' 7\")   Wt 60.3 kg (133 lb)   LMP 2021   SpO2 99%   Breastfeeding No   BMI 20.83 kg/m    Body mass index is 20.83 kg/m .  Physical Exam   General: Pleasant, in no apparent distress.  Eyes: Sclera are white and conjunctiva are clear bilaterally. " Lacrimal apparatus free of erythema, edema, and discharge bilaterally. PERRLA, EOM intact bilaterally.   Neck: No cervical lymphadenopathy on inspection and palpation.  Cardiovascular: Regular rate and rhythm with S1 equal to S2. No murmurs, friction rubs, or gallops.   Respiratory: Lungs are resonant and clear to auscultation bilaterally. No wheezes, crackles, or rhonchi.  NEUROLOGICAL:  Lea score of 15.  Cranial nerves grossly tested and intact without deficit.  No gross muscle wasting and can ambulate and get onto exam table unassisted. Sensation to light touch intact.  No deficit with nose to finger rapid alternating movement. Speech clear.  Answers questions appropriately.    Psych: Appropriate mood and affect.    Results for orders placed or performed in visit on 03/09/21   CBC W PLT No Diff     Status: None   Result Value Ref Range    WBC 7.0 4.0 - 11.0 10e9/L    RBC Count 3.91 3.8 - 5.2 10e12/L    Hemoglobin 12.2 11.7 - 15.7 g/dL    Hematocrit 36.5 35.0 - 47.0 %    MCV 93 78 - 100 fl    MCH 31.2 26.5 - 33.0 pg    MCHC 33.4 31.5 - 36.5 g/dL    RDW 12.6 10.0 - 15.0 %    Platelet Count 261 150 - 450 10e9/L

## 2021-03-09 NOTE — TELEPHONE ENCOUNTER
Returned call about blood pressure, please call back.  Thank you!    Rosa Isela Taylor on 3/9/2021 at 12:41 PM

## 2021-03-09 NOTE — NURSING NOTE
Patient presents to clinic with elevated blood pressure.  Juana Rodriguez LPN ....................  3/9/2021   2:51 PM

## 2021-03-09 NOTE — TELEPHONE ENCOUNTER
"Patient calls back.   Denies any symptoms. No headache. No blurred vision. No difficulty talking or walking. No chest pain. No difficulty breathing. \" I have no symptoms\". Protocol recommends an OV today. Patient verbalized understanding and intent to comply. Patient agreed to be transferred to scheduling to set up a visit for today. Patient to go to the ED   in the interim per protocol:   * Headache, blurred vision, difficulty talking, or difficulty walking occurs  * Chest pain or difficulty breathing occurs  * You become worse  Patient verbalized understanding and intent to comply.     Next 5 appointments (look out 90 days)    Mar 09, 2021  2:40 PM  SHORT with Carmela Glasgow PA-C  [facility] ([facility] ) [address]  [place] [id number] [phone]         Jackeline Cervantes RN on 3/9/2021 at 2:18 PM        Additional Information    Negative: Sounds like a life-threatening emergency to the triager    Negative: Pregnant > 20 weeks or postpartum (< 6 weeks after delivery) and new hand or face swelling    Negative: Pregnant > 20 weeks and BP > 140/90    Negative: Systolic BP >= 160 OR Diastolic >= 100, and any cardiac or neurologic symptoms (e.g., chest pain, difficulty breathing, unsteady gait, blurred vision)    Negative: Patient sounds very sick or weak to the triager    Negative: BP Systolic BP >= 140 OR Diastolic >= 90 and postpartum (from 0 to 6 weeks after delivery)    Negative: Systolic BP >= 180 OR Diastolic >= 110, and missed most recent dose of blood pressure medication    Systolic BP >= 180 OR Diastolic >= 110    Protocols used: HIGH BLOOD PRESSURE-A-OH    "
Call made to patient concerning triage of hypertension. Left message to return call. Nasreen Rowell RN ....................  3/9/2021   12:37 PM      
Patient calling states she has been having high blood pressure and she had some tests done and has not heard back if she is supposed to go on medication or what the next step is. Her pcp is out of clinic this week, however, she would like to speak to a nurse to see if there has been any guidance listed in her chart as to what is should do next. Please call.    Mj Hudson on 3/9/2021 at 10:17 AM    
Routed to [facility] for triage. Jackeline Cervantes RN on 3/9/2021 at 12:34 PM    
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  She will make a log and have it checked daily for the next week. Did advise that if she isn't feeling well (shortness of breath, chest pains, etc..) to come in right away. She will try avoiding salty foods also. Did tell her that the last office note states that it her blood pressures remain above 140/90 that Kayla Maradiaga MD wanted additional work-up.  Tierra Chang LPN, LPN  3/9/2021  11:45 AM             
Treat and Release

## 2021-03-10 RX ORDER — LISINOPRIL 10 MG/1
TABLET ORAL
Qty: 90 TABLET | OUTPATIENT
Start: 2021-03-10

## 2021-03-10 ASSESSMENT — ANXIETY QUESTIONNAIRES: GAD7 TOTAL SCORE: 0

## 2021-03-10 NOTE — TELEPHONE ENCOUNTER
Patient was seen yesterday for BP, was given 30 day + 1 refill and asked to return in 1 month for follow up.  Pt requested 90 day supply but we are sticking with 30 for new medication.  Pharmacy notified.  Dinah Ledezma RN on 3/10/2021 at 9:26 AM

## 2021-03-19 ENCOUNTER — E-VISIT (OUTPATIENT)
Dept: URGENT CARE | Facility: CLINIC | Age: 41
End: 2021-03-19
Payer: COMMERCIAL

## 2021-03-19 DIAGNOSIS — K12.0 CANKER SORE: Primary | ICD-10-CM

## 2021-03-19 PROCEDURE — 99207 PR NON-BILLABLE SERV PER CHARTING: CPT | Performed by: FAMILY MEDICINE

## 2021-03-19 RX ORDER — VALACYCLOVIR HYDROCHLORIDE 1 G/1
2000 TABLET, FILM COATED ORAL 2 TIMES DAILY
Qty: 4 TABLET | Refills: 1 | Status: SHIPPED | OUTPATIENT
Start: 2021-03-19 | End: 2021-04-20

## 2021-03-19 NOTE — PATIENT INSTRUCTIONS
Dear Misa Segura MD    After reviewing your responses, I've been able to diagnose you with coldsores which are common mouth sores caused by infection with the virus herpes.    Based on your responses, I have prescribed valtrex to treat this. Please follow the instructions on the medication. If you experience irritation of your skin, new rash, or any other new symptoms, you should stop using this medication and contact your primary care provider.    If this treatment does not work for you or your sores are worsening instead of improving or do not resolve in 7 days, please plan to follow-up with your primary care provider. They may be able to offer refills for you for future outbreaks as well.    Thanks for choosing?us?as your health care partner,?   ?   Misa Segura MD?

## 2021-03-29 ENCOUNTER — MYC MEDICAL ADVICE (OUTPATIENT)
Dept: FAMILY MEDICINE | Facility: OTHER | Age: 41
End: 2021-03-29

## 2021-04-01 ENCOUNTER — ALLIED HEALTH/NURSE VISIT (OUTPATIENT)
Dept: FAMILY MEDICINE | Facility: OTHER | Age: 41
End: 2021-04-01
Attending: FAMILY MEDICINE
Payer: COMMERCIAL

## 2021-04-01 DIAGNOSIS — R05.9 COUGH: Primary | ICD-10-CM

## 2021-04-01 LAB
SARS-COV-2 RNA RESP QL NAA+PROBE: NORMAL
SPECIMEN SOURCE: NORMAL

## 2021-04-01 PROCEDURE — C9803 HOPD COVID-19 SPEC COLLECT: HCPCS

## 2021-04-01 PROCEDURE — U0005 INFEC AGEN DETEC AMPLI PROBE: HCPCS | Mod: ZL | Performed by: FAMILY MEDICINE

## 2021-04-01 PROCEDURE — U0003 INFECTIOUS AGENT DETECTION BY NUCLEIC ACID (DNA OR RNA); SEVERE ACUTE RESPIRATORY SYNDROME CORONAVIRUS 2 (SARS-COV-2) (CORONAVIRUS DISEASE [COVID-19]), AMPLIFIED PROBE TECHNIQUE, MAKING USE OF HIGH THROUGHPUT TECHNOLOGIES AS DESCRIBED BY CMS-2020-01-R: HCPCS | Mod: ZL | Performed by: FAMILY MEDICINE

## 2021-04-02 LAB
LABORATORY COMMENT REPORT: NORMAL
SARS-COV-2 RNA RESP QL NAA+PROBE: NEGATIVE
SPECIMEN SOURCE: NORMAL

## 2021-04-13 ENCOUNTER — MYC MEDICAL ADVICE (OUTPATIENT)
Dept: FAMILY MEDICINE | Facility: OTHER | Age: 41
End: 2021-04-13

## 2021-04-15 ENCOUNTER — MYC MEDICAL ADVICE (OUTPATIENT)
Dept: FAMILY MEDICINE | Facility: OTHER | Age: 41
End: 2021-04-15

## 2021-04-16 DIAGNOSIS — R03.0 ELEVATED BLOOD PRESSURE READING: ICD-10-CM

## 2021-04-17 ENCOUNTER — MYC MEDICAL ADVICE (OUTPATIENT)
Dept: FAMILY MEDICINE | Facility: OTHER | Age: 41
End: 2021-04-17

## 2021-04-17 DIAGNOSIS — R03.0 ELEVATED BLOOD PRESSURE READING: ICD-10-CM

## 2021-04-19 ENCOUNTER — MYC MEDICAL ADVICE (OUTPATIENT)
Dept: FAMILY MEDICINE | Facility: OTHER | Age: 41
End: 2021-04-19

## 2021-04-19 RX ORDER — LISINOPRIL 10 MG/1
TABLET ORAL
Qty: 30 TABLET | Refills: 1 | OUTPATIENT
Start: 2021-04-19

## 2021-04-19 RX ORDER — LISINOPRIL 10 MG/1
10 TABLET ORAL DAILY
Qty: 30 TABLET | Refills: 0 | Status: CANCELLED | OUTPATIENT
Start: 2021-04-19

## 2021-04-19 NOTE — TELEPHONE ENCOUNTER
Noted. Patient has scheduled for tomorrow with myself.   Carmela Glasgow PA-C on 4/19/2021 at 3:24 PM

## 2021-04-19 NOTE — TELEPHONE ENCOUNTER
" Disp Refills Start End SANDHYA   lisinopril (ZESTRIL) 10 MG tablet 30 tablet 1 3/9/2021  --   Sig - Route: Take 1 tablet (10 mg) by mouth daily - Oral       LOV: 3/9/21  Future Office visit:    Next 5 appointments (look out 90 days)    Apr 20, 2021  3:20 PM  SHORT with Carmela Glasgow PA-C  [facility] ([facility] ) [address]  [place] [address] [phone]          Routing refill request to provider for review/approval because:  Blood pressure out of range     Requested Prescriptions   Pending Prescriptions Disp Refills     lisinopril (ZESTRIL) 10 MG tablet [Pharmacy Med Name: LISINOPRIL 10MG TABLETS] 30 tablet 1     Sig: TAKE 1 TABLET(10 MG) BY MOUTH DAILY       ACE Inhibitors (Including Combos) Protocol Failed - 4/16/2021  7:33 PM        Failed - Blood pressure under 140/90 in past 12 months     BP Readings from Last 3 Encounters:   03/09/21 (!) 192/100   02/08/21 (!) 142/98   11/18/19 136/84                 Passed - Recent (12 mo) or future (30 days) visit within the authorizing provider's specialty     Patient has had an office visit with the authorizing provider or a provider within the authorizing providers department within the previous 12 mos or has a future within next 30 days. See \"Patient Info\" tab in inbasket, or \"Choose Columns\" in Meds & Orders section of the refill encounter.              Passed - Medication is active on med list        Passed - Patient is age 18 or older        Passed - No active pregnancy on record        Passed - Normal serum creatinine on file in past 12 months     Recent Labs   Lab Test 03/09/21  1507   CR 0.94       Ok to refill medication if creatinine is low          Passed - Normal serum potassium on file in past 12 months     Recent Labs   Lab Test 03/09/21  1507   POTASSIUM 3.6             Passed - No positive pregnancy test within past 12 months         Unable to complete prescription refill " per RN Medication Refill Policy.................... Ksenia Granger RN ....................  4/19/2021   4:08 PM

## 2021-04-19 NOTE — TELEPHONE ENCOUNTER
Will address at appointment scheduled for tomorrow.     Carmela Glasgow PA-C on 4/19/2021 at 4:10 PM

## 2021-04-20 ENCOUNTER — OFFICE VISIT (OUTPATIENT)
Dept: FAMILY MEDICINE | Facility: OTHER | Age: 41
End: 2021-04-20
Attending: NURSE PRACTITIONER
Payer: COMMERCIAL

## 2021-04-20 VITALS
DIASTOLIC BLOOD PRESSURE: 100 MMHG | BODY MASS INDEX: 20.07 KG/M2 | SYSTOLIC BLOOD PRESSURE: 158 MMHG | TEMPERATURE: 97 F | RESPIRATION RATE: 16 BRPM | HEART RATE: 88 BPM | OXYGEN SATURATION: 99 % | WEIGHT: 128.13 LBS

## 2021-04-20 DIAGNOSIS — I10 BENIGN ESSENTIAL HYPERTENSION: Primary | ICD-10-CM

## 2021-04-20 DIAGNOSIS — R03.0 ELEVATED BLOOD PRESSURE READING: ICD-10-CM

## 2021-04-20 DIAGNOSIS — I10 BENIGN ESSENTIAL HYPERTENSION: ICD-10-CM

## 2021-04-20 PROCEDURE — 99213 OFFICE O/P EST LOW 20 MIN: CPT | Performed by: NURSE PRACTITIONER

## 2021-04-20 RX ORDER — LISINOPRIL AND HYDROCHLOROTHIAZIDE 20; 25 MG/1; MG/1
1 TABLET ORAL DAILY
Qty: 30 TABLET | Refills: 1 | Status: SHIPPED | OUTPATIENT
Start: 2021-04-20 | End: 2021-04-22

## 2021-04-20 RX ORDER — DEXTROAMPHETAMINE SACCHARATE, AMPHETAMINE ASPARTATE, DEXTROAMPHETAMINE SULFATE AND AMPHETAMINE SULFATE 5; 5; 5; 5 MG/1; MG/1; MG/1; MG/1
20 TABLET ORAL DAILY
COMMUNITY
Start: 2021-04-20

## 2021-04-20 RX ORDER — LISINOPRIL 10 MG/1
TABLET ORAL DAILY
Status: CANCELLED | OUTPATIENT
Start: 2021-04-20

## 2021-04-20 ASSESSMENT — PATIENT HEALTH QUESTIONNAIRE - PHQ9
5. POOR APPETITE OR OVEREATING: NOT AT ALL
SUM OF ALL RESPONSES TO PHQ QUESTIONS 1-9: 0

## 2021-04-20 ASSESSMENT — ANXIETY QUESTIONNAIRES
6. BECOMING EASILY ANNOYED OR IRRITABLE: NOT AT ALL
IF YOU CHECKED OFF ANY PROBLEMS ON THIS QUESTIONNAIRE, HOW DIFFICULT HAVE THESE PROBLEMS MADE IT FOR YOU TO DO YOUR WORK, TAKE CARE OF THINGS AT HOME, OR GET ALONG WITH OTHER PEOPLE: NOT DIFFICULT AT ALL
2. NOT BEING ABLE TO STOP OR CONTROL WORRYING: NOT AT ALL
7. FEELING AFRAID AS IF SOMETHING AWFUL MIGHT HAPPEN: NOT AT ALL
GAD7 TOTAL SCORE: 0
1. FEELING NERVOUS, ANXIOUS, OR ON EDGE: NOT AT ALL
5. BEING SO RESTLESS THAT IT IS HARD TO SIT STILL: NOT AT ALL
3. WORRYING TOO MUCH ABOUT DIFFERENT THINGS: NOT AT ALL

## 2021-04-20 ASSESSMENT — PAIN SCALES - GENERAL: PAINLEVEL: NO PAIN (0)

## 2021-04-20 NOTE — PROGRESS NOTES
HPI:    Jailene Mccoy is a 40 year old female who presents to clinic today for follow-up on hypertension.  She was placed on blood pressure medications on March 9 starting with lisinopril 10 mg which has not been increased to 20 mg daily.Prescribing provider has requested that she come into clinic to have a blood pressure check and evaluation before further refills were done.  She reports her home blood pressures have been running between 155-170.  She does state that she started exercising recently.  She typically would eat whatever she wants and left salty foods but she is trying to make changes in this.  She does not smoke.  She does have a family history of hypertension including her paternal aunt, uncle and grandmother.  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  Denies any chest pains or shortness of breath.  No swelling in her legs and no significant changes in her weight.    Past Medical History:   Diagnosis Date     Attention-deficit hyperactivity disorder     teen onset/[facility]s thru [facility]     Hepatic hemangioma 02/17/2021     Personal history of other medical treatment (CODE)     G2Para 2-0-0-2         Current Outpatient Medications   Medication Sig Dispense Refill     amphetamine-dextroamphetamine (ADDERALL) 20 MG tablet Take 1 tablet (20 mg) by mouth daily       lisinopril-hydrochlorothiazide (ZESTORETIC) 20-25 MG tablet Take 1 tablet by mouth daily 30 tablet 1     ADDERALL XR 30 MG 24 hr capsule 8/20/19 SANDHYA TAKE 1 CAPSULE BY MOUTH EVERY MORNING  0     norgestrel-ethinyl estradiol (CRYSELLE-28) 0.3-30 MG-MCG tablet Take 1 tablet by mouth daily 84 tablet 0     valACYclovir (VALTREX) 500 MG tablet Take 500 mg by mouth As needed for cold sores         No Known Allergies    ROS:  Pertinent positives and negatives are noted in HPI.    EXAM:  BP (!) 158/100 (BP Location: Left arm, Patient Position:  Sitting, Cuff Size: Adult Regular)   Pulse 88   Temp 97  F (36.1  C) (Temporal)   Resp 16   Wt 58.1 kg (128 lb 2 oz)   SpO2 99%   BMI 20.07 kg/m    General appearance: well appearing thin female, in no acute distress  Respiratory: clear to auscultation bilaterally  Cardiac: RRR with no murmurs  Psychological: normal affect, alert and pleasant    ASSESSMENT AND PLAN:    1. Benign essential hypertension    2. Elevated blood pressure reading    She continues to have elevated blood pressures despite treatment of lisinopril 20 mg daily.  Unfortunately she left before we did a recheck of her blood pressure.  At this time we will plan to add hydrochlorothiazide 25 mg to the lisinopril 20 mg daily.  To continue to check blood pressures 3-5 times weekly at home.  I like to have her come back into the clinic to see myself or primary care provider in 2 to 4 weeks, sooner if there is concerns.        SAMIR Aquino CNP..................4/20/2021 1:43 PM

## 2021-04-20 NOTE — NURSING NOTE
"Patient presents to the clinic for follow up with hypertension.      Chief Complaint   Patient presents with     Clinic Care Coordination - Follow-up       Initial BP (!) 158/100 (BP Location: Left arm, Patient Position: Sitting, Cuff Size: Adult Regular)   Pulse 88   Temp 97  F (36.1  C) (Temporal)   Resp 16   Wt 58.1 kg (128 lb 2 oz)   SpO2 99%   BMI 20.07 kg/m   Estimated body mass index is 20.07 kg/m  as calculated from the following:    Height as of 3/9/21: 1.702 m (5' 7\").    Weight as of this encounter: 58.1 kg (128 lb 2 oz).  Medication Reconciliation: complete    Malu Heart LPN    "

## 2021-04-21 ASSESSMENT — ANXIETY QUESTIONNAIRES: GAD7 TOTAL SCORE: 0

## 2021-04-22 RX ORDER — LISINOPRIL AND HYDROCHLOROTHIAZIDE 20; 25 MG/1; MG/1
1 TABLET ORAL DAILY
Qty: 90 TABLET | Refills: 0 | Status: SHIPPED | OUTPATIENT
Start: 2021-04-22 | End: 2021-05-12

## 2021-04-22 NOTE — TELEPHONE ENCOUNTER
trevor ORTIZ  sent Rx request for the following:     lisinopril-hydrochlorothiazide (ZESTORETIC) 20-25 MG tablet  Sig TAKE 1 TABLET BY MOUTH DAILY  Last Prescription Date:   4/20/21  Last Fill Qty/Refills:         30, R-1    Last Office Visit:              4/20/21  Future Office visit:            none    Routing refill request to provider for review/approval because:  Request for 90 day supply, provider to determine if appropriate.     Unable to complete prescription refill per RN Medication Refill Policy.................... Kathy Mercer RN ....................  4/22/2021   11:17 AM

## 2021-04-25 ENCOUNTER — HEALTH MAINTENANCE LETTER (OUTPATIENT)
Age: 41
End: 2021-04-25

## 2021-05-12 ENCOUNTER — OFFICE VISIT (OUTPATIENT)
Dept: FAMILY MEDICINE | Facility: OTHER | Age: 41
End: 2021-05-12
Attending: FAMILY MEDICINE
Payer: COMMERCIAL

## 2021-05-12 VITALS
TEMPERATURE: 97.9 F | HEART RATE: 92 BPM | DIASTOLIC BLOOD PRESSURE: 84 MMHG | OXYGEN SATURATION: 99 % | RESPIRATION RATE: 16 BRPM | BODY MASS INDEX: 20.05 KG/M2 | SYSTOLIC BLOOD PRESSURE: 132 MMHG | WEIGHT: 128 LBS

## 2021-05-12 DIAGNOSIS — Z12.4 CERVICAL CANCER SCREENING: Primary | ICD-10-CM

## 2021-05-12 DIAGNOSIS — I10 BENIGN ESSENTIAL HYPERTENSION: ICD-10-CM

## 2021-05-12 DIAGNOSIS — F32.81 PMDD (PREMENSTRUAL DYSPHORIC DISORDER): ICD-10-CM

## 2021-05-12 PROCEDURE — G0123 SCREEN CERV/VAG THIN LAYER: HCPCS | Performed by: FAMILY MEDICINE

## 2021-05-12 PROCEDURE — 96372 THER/PROPH/DIAG INJ SC/IM: CPT | Performed by: FAMILY MEDICINE

## 2021-05-12 PROCEDURE — 87624 HPV HI-RISK TYP POOLED RSLT: CPT | Mod: ZL | Performed by: FAMILY MEDICINE

## 2021-05-12 PROCEDURE — 99214 OFFICE O/P EST MOD 30 MIN: CPT | Performed by: FAMILY MEDICINE

## 2021-05-12 PROCEDURE — 250N000011 HC RX IP 250 OP 636: Performed by: FAMILY MEDICINE

## 2021-05-12 PROCEDURE — 88142 CYTOPATH C/V THIN LAYER: CPT | Performed by: FAMILY MEDICINE

## 2021-05-12 RX ORDER — MEDROXYPROGESTERONE ACETATE 150 MG/ML
150 INJECTION, SUSPENSION INTRAMUSCULAR
Status: ACTIVE | OUTPATIENT
Start: 2021-05-12

## 2021-05-12 RX ORDER — LISINOPRIL AND HYDROCHLOROTHIAZIDE 20; 25 MG/1; MG/1
1 TABLET ORAL DAILY
Qty: 90 TABLET | Refills: 3 | Status: SHIPPED | OUTPATIENT
Start: 2021-05-12 | End: 2022-05-09

## 2021-05-12 RX ORDER — NORGESTREL-ETHINYL ESTRADIOL 0.3-0.03MG
1 TABLET ORAL DAILY
Qty: 84 TABLET | Refills: 3 | Status: CANCELLED | OUTPATIENT
Start: 2021-05-12

## 2021-05-12 RX ADMIN — MEDROXYPROGESTERONE ACETATE 150 MG: 150 INJECTION, SUSPENSION, EXTENDED RELEASE INTRAMUSCULAR at 14:53

## 2021-05-12 SDOH — SOCIAL STABILITY: SOCIAL NETWORK: ARE YOU MARRIED, WIDOWED, DIVORCED, SEPARATED, NEVER MARRIED, OR LIVING WITH A PARTNER?: MARRIED

## 2021-05-12 SDOH — SOCIAL STABILITY: SOCIAL NETWORK: HOW OFTEN DO YOU ATTENT MEETINGS OF THE CLUB OR ORGANIZATION YOU BELONG TO?: NOT ASKED

## 2021-05-12 SDOH — SOCIAL STABILITY: SOCIAL NETWORK: HOW OFTEN DO YOU ATTEND CHURCH OR RELIGIOUS SERVICES?: NOT ASKED

## 2021-05-12 SDOH — HEALTH STABILITY: MENTAL HEALTH: HOW OFTEN DO YOU HAVE 6 OR MORE DRINKS ON ONE OCCASION?: NOT ASKED

## 2021-05-12 SDOH — SOCIAL STABILITY: SOCIAL NETWORK
DO YOU BELONG TO ANY CLUBS OR ORGANIZATIONS SUCH AS CHURCH GROUPS UNIONS, FRATERNAL OR ATHLETIC GROUPS, OR SCHOOL GROUPS?: NOT ASKED

## 2021-05-12 SDOH — SOCIAL STABILITY: SOCIAL INSECURITY: WITHIN THE LAST YEAR, HAVE YOU BEEN AFRAID OF YOUR PARTNER OR EX-PARTNER?: NOT ASKED

## 2021-05-12 SDOH — HEALTH STABILITY: PHYSICAL HEALTH: ON AVERAGE, HOW MANY DAYS PER WEEK DO YOU ENGAGE IN MODERATE TO STRENUOUS EXERCISE (LIKE A BRISK WALK)?: 3 DAYS

## 2021-05-12 SDOH — SOCIAL STABILITY: SOCIAL NETWORK
IN A TYPICAL WEEK, HOW MANY TIMES DO YOU TALK ON THE PHONE WITH FAMILY, FRIENDS, OR NEIGHBORS?: MORE THAN THREE TIMES A WEEK

## 2021-05-12 SDOH — SOCIAL STABILITY: SOCIAL INSECURITY
WITHIN THE LAST YEAR, HAVE YOU BEEN HUMILIATED OR EMOTIONALLY ABUSED IN OTHER WAYS BY YOUR PARTNER OR EX-PARTNER?: NOT ASKED

## 2021-05-12 SDOH — HEALTH STABILITY: MENTAL HEALTH
STRESS IS WHEN SOMEONE FEELS TENSE, NERVOUS, ANXIOUS, OR CAN'T SLEEP AT NIGHT BECAUSE THEIR MIND IS TROUBLED. HOW STRESSED ARE YOU?: NOT ASKED

## 2021-05-12 SDOH — HEALTH STABILITY: MENTAL HEALTH: HOW MANY STANDARD DRINKS CONTAINING ALCOHOL DO YOU HAVE ON A TYPICAL DAY?: 1 OR 2

## 2021-05-12 SDOH — HEALTH STABILITY: PHYSICAL HEALTH: ON AVERAGE, HOW MANY MINUTES DO YOU ENGAGE IN EXERCISE AT THIS LEVEL?: NOT ASKED

## 2021-05-12 SDOH — SOCIAL STABILITY: SOCIAL INSECURITY
WITHIN THE LAST YEAR, HAVE TO BEEN RAPED OR FORCED TO HAVE ANY KIND OF SEXUAL ACTIVITY BY YOUR PARTNER OR EX-PARTNER?: NOT ASKED

## 2021-05-12 SDOH — SOCIAL STABILITY: SOCIAL INSECURITY
WITHIN THE LAST YEAR, HAVE YOU BEEN KICKED, HIT, SLAPPED, OR OTHERWISE PHYSICALLY HURT BY YOUR PARTNER OR EX-PARTNER?: NOT ASKED

## 2021-05-12 SDOH — SOCIAL STABILITY: SOCIAL NETWORK: HOW OFTEN DO YOU GET TOGETHER WITH FRIENDS OR RELATIVES?: NOT ASKED

## 2021-05-12 SDOH — HEALTH STABILITY: MENTAL HEALTH: HOW OFTEN DO YOU HAVE A DRINK CONTAINING ALCOHOL?: 2-3 TIMES A WEEK

## 2021-05-12 ASSESSMENT — ANXIETY QUESTIONNAIRES
7. FEELING AFRAID AS IF SOMETHING AWFUL MIGHT HAPPEN: NOT AT ALL
5. BEING SO RESTLESS THAT IT IS HARD TO SIT STILL: NOT AT ALL
GAD7 TOTAL SCORE: 0
IF YOU CHECKED OFF ANY PROBLEMS ON THIS QUESTIONNAIRE, HOW DIFFICULT HAVE THESE PROBLEMS MADE IT FOR YOU TO DO YOUR WORK, TAKE CARE OF THINGS AT HOME, OR GET ALONG WITH OTHER PEOPLE: NOT DIFFICULT AT ALL
2. NOT BEING ABLE TO STOP OR CONTROL WORRYING: NOT AT ALL
1. FEELING NERVOUS, ANXIOUS, OR ON EDGE: NOT AT ALL
3. WORRYING TOO MUCH ABOUT DIFFERENT THINGS: NOT AT ALL
6. BECOMING EASILY ANNOYED OR IRRITABLE: NOT AT ALL

## 2021-05-12 ASSESSMENT — PATIENT HEALTH QUESTIONNAIRE - PHQ9
SUM OF ALL RESPONSES TO PHQ QUESTIONS 1-9: 0
5. POOR APPETITE OR OVEREATING: NOT AT ALL

## 2021-05-12 ASSESSMENT — PAIN SCALES - GENERAL: PAINLEVEL: NO PAIN (0)

## 2021-05-12 NOTE — PROGRESS NOTES
"Nursing Notes:   Lizette Castillo MA  5/12/2021  2:17 PM  Signed  Chief Complaint   Patient presents with     Gyn Exam     Refill Request     birth control      Patient is here for pap smear and refill on birth control     Initial /84 (BP Location: Right arm, Patient Position: Sitting, Cuff Size: Adult Regular)   Pulse 92   Temp 97.9  F (36.6  C) (Tympanic)   Resp 16   Wt 58.1 kg (128 lb)   LMP 04/14/2021   SpO2 99%   Breastfeeding No   BMI 20.05 kg/m   Estimated body mass index is 20.05 kg/m  as calculated from the following:    Height as of 3/9/21: 1.702 m (5' 7\").    Weight as of this encounter: 58.1 kg (128 lb).  Medication Reconciliation: complete    Lizette Castillo MA       SUBJECTIVE:   CC:  Jailene Mccoy is a 40 year old female who presents to clinic today for the following health issues:  Pap smear     HPI  Jailene Mccoy is a 40 year old female who presents for pap smear.  Patient is  -monogamous relationship.  Her last Pap smear was done in 2015.  Current method of birth control is tubal ligation done in 2018.  However, patient has been on birth control pills for mood and acne.    Hypertension: This was diagnosed this spring.  She is currently on Zestoretic.  As noted above however, she is also currently on birth control pills.  She does not take anti-inflammatories regularly    COVID vaccines completed  .     No Known Allergies  Current Outpatient Medications   Medication     ADDERALL XR 30 MG 24 hr capsule     amphetamine-dextroamphetamine (ADDERALL) 20 MG tablet     lisinopril-hydrochlorothiazide (ZESTORETIC) 20-25 MG tablet     norgestrel-ethinyl estradiol (CRYSELLE-28) 0.3-30 MG-MCG tablet     valACYclovir (VALTREX) 500 MG tablet     Current Facility-Administered Medications   Medication     medroxyPROGESTERone (DEPO-PROVERA) injection 150 mg      Past Medical History:   Diagnosis Date     Attention-deficit hyperactivity disorder     teen onset/meds thru [facility]     Hepatic " hemangioma 2021     Personal history of other medical treatment (CODE)     G2Para 2-0-0-2      Past Surgical History:   Procedure Laterality Date     BUNIONECTOMY      3-,left      SECTION      ,breech presentation     chin surgery  2008     HERNIORRHAPHY VENTRAL N/A 2018    Epigastric Ventral Hernia Repair & Laparoscopic Tubal Ligation;  Surgeon: Bobby Jones MD;  Location: [facility] OR     LAPAROSCOPIC TUBAL LIGATION Bilateral 2018    Procedure: LAPAROSCOPIC TUBAL LIGATION;;  Surgeon: Ike Krishnan MD;  Location: [facility] OR     wisdom teeth         Review of Systems     PHQ-2 Score:     PHQ-2 (  [employer]) 2021   Q1: Little interest or pleasure in doing things 0 0   Q2: Feeling down, depressed or hopeless 0 0   PHQ-2 Score 0 0         PHQ-9 SCORE 3/9/2021 2021 2021   PHQ-9 Total Score 0 0 0     BAYLEE-7 SCORE 3/9/2021 2021 2021   Total Score 0 0 0             OBJECTIVE:     /84 (BP Location: Right arm, Patient Position: Sitting, Cuff Size: Adult Regular)   Pulse 92   Temp 97.9  F (36.6  C) (Tympanic)   Resp 16   Wt 58.1 kg (128 lb)   LMP 2021   SpO2 99%   Breastfeeding No   BMI 20.05 kg/m    Wt Readings from Last 3 Encounters:   21 58.1 kg (128 lb)   21 58.1 kg (128 lb 2 oz)   21 60.3 kg (133 lb)       Body mass index is 20.05 kg/m .  Physical Exam  Vitals signs reviewed.   Constitutional:       Appearance: Normal appearance.   Genitourinary:     Comments: External genitalia is normal.  On speculum exam there is no cervical or vaginal discharge.  Pap smear is obtained.  Neurological:      Mental Status: She is alert.   Psychiatric:         Mood and Affect: Mood normal.          No results found for any visits on 21.      ASSESSMENT/PLAN:     1. Cervical cancer screening    2. Benign essential hypertension    3. PMDD (premenstrual dysphoric disorder)        Assessment & Plan   Problem List Items Addressed  This Visit     None      Visit Diagnoses     Cervical cancer screening    -  Primary    Relevant Orders    HPV High Risk Types DNA Cervical    Pap Screen Thin Prep with HPV - recommended age 30 - 65 years (select HPV order below)    Benign essential hypertension        Relevant Medications    lisinopril-hydrochlorothiazide (ZESTORETIC) 20-25 MG tablet    Other Relevant Orders    Basic Metabolic Panel    PMDD (premenstrual dysphoric disorder)        Relevant Medications    medroxyPROGESTERone (DEPO-PROVERA) injection 150 mg          1.  Pap smear and HPV testing obtained today.  Patient be contacted with results when available.  If normal, next screening would be due in 5 years.  2.  Blood pressure shows improved/adequate control.  However, patient is still on oral contraceptives.  With hypertension, these should be discontinued.  Oral contraceptives are discontinued.  In the past, she had taken Depo-Provera to help with similar mood related symptoms and would like to resume that.  Noted above, her current birth control is tubal ligation.  She is given Depo-Provera 150 mg IM.  Return every 3 months.  Recommend that she start calcium and vitamin D while on Depo-Provera and at this age, not be on greater than 5 years.  3.  Continue Zestoretic.  She will need follow-up metabolic panel.      DAISY VAZ MD  [facility]

## 2021-05-12 NOTE — NURSING NOTE
"Chief Complaint   Patient presents with     Gyn Exam     Refill Request     birth control      Patient is here for pap smear and refill on birth control     Initial /84 (BP Location: Right arm, Patient Position: Sitting, Cuff Size: Adult Regular)   Pulse 92   Temp 97.9  F (36.6  C) (Tympanic)   Resp 16   Wt 58.1 kg (128 lb)   LMP 04/14/2021   SpO2 99%   Breastfeeding No   BMI 20.05 kg/m   Estimated body mass index is 20.05 kg/m  as calculated from the following:    Height as of 3/9/21: 1.702 m (5' 7\").    Weight as of this encounter: 58.1 kg (128 lb).  Medication Reconciliation: complete    Lizette Castillo MA     Patient was given Depo Provera     Lizette Castillo CMA on 5/12/2021 at 2:53 PM      "

## 2021-05-13 ASSESSMENT — ANXIETY QUESTIONNAIRES: GAD7 TOTAL SCORE: 0

## 2021-05-17 LAB
COPATH REPORT: NORMAL
PAP: NORMAL

## 2021-05-19 LAB
FINAL DIAGNOSIS: NORMAL
HPV HR 12 DNA CVX QL NAA+PROBE: NEGATIVE
HPV16 DNA SPEC QL NAA+PROBE: NEGATIVE
HPV18 DNA SPEC QL NAA+PROBE: NEGATIVE
SPECIMEN DESCRIPTION: NORMAL
SPECIMEN SOURCE CVX/VAG CYTO: NORMAL

## 2021-07-08 ENCOUNTER — MYC MEDICAL ADVICE (OUTPATIENT)
Dept: FAMILY MEDICINE | Facility: OTHER | Age: 41
End: 2021-07-08

## 2021-07-27 ENCOUNTER — MYC MEDICAL ADVICE (OUTPATIENT)
Dept: FAMILY MEDICINE | Facility: OTHER | Age: 41
End: 2021-07-27

## 2021-07-28 NOTE — TELEPHONE ENCOUNTER
Pending Prescriptions:                       Disp   Refills    norgestrel-ethinyl estradiol (CRYSELLE-28*84 tab*3            Sig: Take 1 tablet by mouth daily

## 2021-07-30 RX ORDER — NORGESTREL-ETHINYL ESTRADIOL 0.3-0.03MG
1 TABLET ORAL DAILY
Qty: 84 TABLET | Refills: 3 | OUTPATIENT
Start: 2021-07-30

## 2021-07-30 NOTE — TELEPHONE ENCOUNTER
Please call patient - due to hypertension she should not be on combination birth control pills.  Kayla Maradiaga MD

## 2022-05-07 DIAGNOSIS — I10 BENIGN ESSENTIAL HYPERTENSION: ICD-10-CM

## 2022-05-09 RX ORDER — LISINOPRIL AND HYDROCHLOROTHIAZIDE 20; 25 MG/1; MG/1
1 TABLET ORAL DAILY
Qty: 90 TABLET | Refills: 0 | Status: SHIPPED | OUTPATIENT
Start: 2022-05-09 | End: 2022-05-17

## 2022-05-09 NOTE — TELEPHONE ENCOUNTER
[facility] sent Rx request for the following:      Requested Prescriptions   Pending Prescriptions Disp Refills     lisinopril-hydrochlorothiazide (ZESTORETIC) 20-25 MG tablet [Pharmacy Med Name: LISINOPRIL-HCTZ 20/25MG TABLETS] 90 tablet 3     Sig: TAKE 1 TABLET BY MOUTH DAILY       Diuretics (Including Combos) Protocol Failed - 5/9/2022  4:54 PM        Failed - Normal serum creatinine on file in past 12 months     Recent Labs   Lab Test 03/09/21  1507   CR 0.94           Failed - Normal serum potassium on file in past 12 months     Recent Labs   Lab Test 03/09/21  1507   POTASSIUM 3.6           Failed - Normal serum sodium on file in past 12 months     Recent Labs   Lab Test 03/09/21  1507          ACE Inhibitors (Including Combos) Protocol Failed - 5/9/2022  4:54 PM        Failed - Normal serum creatinine on file in past 12 months     Recent Labs   Lab Test 03/09/21  1507   CR 0.94           Failed - Normal serum potassium on file in past 12 months     Recent Labs   Lab Test 03/09/21  1507   POTASSIUM 3.6        Last Prescription Date:   5/12/21  Last Fill Qty/Refills:         90, R-3    Last Office Visit:              5/12/21   Future Office visit:           None    Pt due for annual exam after 5/12/22. Routing to provider for refill consideration. Routing to  OUTREACH APPT REQUESTS pool, to assist Pt in scheduling appointment.     Mariana Krishnamurthy RN .............. 5/9/2022  4:56 PM

## 2022-05-14 DIAGNOSIS — I10 BENIGN ESSENTIAL HYPERTENSION: ICD-10-CM

## 2022-05-17 RX ORDER — LISINOPRIL AND HYDROCHLOROTHIAZIDE 20; 25 MG/1; MG/1
1 TABLET ORAL DAILY
Qty: 90 TABLET | Refills: 0 | Status: SHIPPED | OUTPATIENT
Start: 2022-05-17 | End: 2022-06-06

## 2022-05-17 NOTE — TELEPHONE ENCOUNTER
[facility] sent Rx request for the following:      LISINOPRIL-HCTZ 20/25MG TABLETS      Last Prescription Date:   5/9/2022  Last Fill Qty/Refills:         90, R-0        Patient wanting refill sent to different [facility] pharmacy.     Pablo Garrido RN, BSN  ....................  5/17/2022   2:56 PM

## 2022-05-21 ENCOUNTER — HEALTH MAINTENANCE LETTER (OUTPATIENT)
Age: 42
End: 2022-05-21

## 2022-06-06 ENCOUNTER — OFFICE VISIT (OUTPATIENT)
Dept: FAMILY MEDICINE | Facility: OTHER | Age: 42
End: 2022-06-06
Attending: FAMILY MEDICINE
Payer: COMMERCIAL

## 2022-06-06 VITALS
WEIGHT: 126.8 LBS | RESPIRATION RATE: 16 BRPM | TEMPERATURE: 96.8 F | HEART RATE: 72 BPM | HEIGHT: 67 IN | OXYGEN SATURATION: 98 % | DIASTOLIC BLOOD PRESSURE: 72 MMHG | SYSTOLIC BLOOD PRESSURE: 104 MMHG | BODY MASS INDEX: 19.9 KG/M2

## 2022-06-06 DIAGNOSIS — Z12.31 VISIT FOR SCREENING MAMMOGRAM: ICD-10-CM

## 2022-06-06 DIAGNOSIS — B00.1 COLD SORE: ICD-10-CM

## 2022-06-06 DIAGNOSIS — Z00.00 PHYSICAL EXAM, ANNUAL: Primary | ICD-10-CM

## 2022-06-06 DIAGNOSIS — N92.0 MENORRHAGIA WITH REGULAR CYCLE: ICD-10-CM

## 2022-06-06 DIAGNOSIS — I10 BENIGN ESSENTIAL HYPERTENSION: ICD-10-CM

## 2022-06-06 LAB
ANION GAP SERPL CALCULATED.3IONS-SCNC: 7 MMOL/L (ref 3–14)
BUN SERPL-MCNC: 25 MG/DL (ref 7–25)
CALCIUM SERPL-MCNC: 9.1 MG/DL (ref 8.6–10.3)
CHLORIDE BLD-SCNC: 102 MMOL/L (ref 98–107)
CO2 SERPL-SCNC: 28 MMOL/L (ref 21–31)
CREAT SERPL-MCNC: 0.98 MG/DL (ref 0.6–1.2)
GFR SERPL CREATININE-BSD FRML MDRD: 74 ML/MIN/1.73M2
GLUCOSE BLD-MCNC: 88 MG/DL (ref 70–105)
HGB BLD-MCNC: 12 G/DL (ref 11.7–15.7)
POTASSIUM BLD-SCNC: 3.8 MMOL/L (ref 3.5–5.1)
SODIUM SERPL-SCNC: 137 MMOL/L (ref 134–144)

## 2022-06-06 PROCEDURE — 85018 HEMOGLOBIN: CPT | Mod: ZL | Performed by: FAMILY MEDICINE

## 2022-06-06 PROCEDURE — 36415 COLL VENOUS BLD VENIPUNCTURE: CPT | Mod: ZL | Performed by: FAMILY MEDICINE

## 2022-06-06 PROCEDURE — 80048 BASIC METABOLIC PNL TOTAL CA: CPT | Mod: ZL | Performed by: FAMILY MEDICINE

## 2022-06-06 PROCEDURE — 99396 PREV VISIT EST AGE 40-64: CPT | Performed by: FAMILY MEDICINE

## 2022-06-06 RX ORDER — ACETAMINOPHEN AND CODEINE PHOSPHATE 120; 12 MG/5ML; MG/5ML
0.35 SOLUTION ORAL DAILY
Qty: 84 TABLET | Refills: 3 | Status: SHIPPED | OUTPATIENT
Start: 2022-06-06 | End: 2023-03-14

## 2022-06-06 RX ORDER — VALACYCLOVIR HYDROCHLORIDE 1 G/1
1000 TABLET, FILM COATED ORAL 2 TIMES DAILY
Qty: 25 TABLET | Refills: 1 | Status: SHIPPED | OUTPATIENT
Start: 2022-06-06 | End: 2023-04-22

## 2022-06-06 RX ORDER — LISINOPRIL AND HYDROCHLOROTHIAZIDE 20; 25 MG/1; MG/1
0.5 TABLET ORAL DAILY
Qty: 45 TABLET | Refills: 3 | Status: SHIPPED | OUTPATIENT
Start: 2022-06-06 | End: 2023-03-14

## 2022-06-06 RX ORDER — VALACYCLOVIR HYDROCHLORIDE 1 G/1
TABLET, FILM COATED ORAL
COMMUNITY
Start: 2021-08-22 | End: 2022-06-06

## 2022-06-06 ASSESSMENT — ENCOUNTER SYMPTOMS
EYE PAIN: 0
FREQUENCY: 0
CONSTIPATION: 0
PALPITATIONS: 1
HEMATURIA: 0
SHORTNESS OF BREATH: 1
NAUSEA: 0
CHILLS: 0
MYALGIAS: 0
HEMATOCHEZIA: 0
ARTHRALGIAS: 0
JOINT SWELLING: 0
DIARRHEA: 0
COUGH: 0
SORE THROAT: 0
HEARTBURN: 0
BREAST MASS: 0
DIZZINESS: 1
NERVOUS/ANXIOUS: 0
ABDOMINAL PAIN: 0
HEADACHES: 0
WEAKNESS: 0
FEVER: 0
PARESTHESIAS: 0
DYSURIA: 0

## 2022-06-06 ASSESSMENT — ANXIETY QUESTIONNAIRES
8. IF YOU CHECKED OFF ANY PROBLEMS, HOW DIFFICULT HAVE THESE MADE IT FOR YOU TO DO YOUR WORK, TAKE CARE OF THINGS AT HOME, OR GET ALONG WITH OTHER PEOPLE?: NOT DIFFICULT AT ALL
GAD7 TOTAL SCORE: 0
3. WORRYING TOO MUCH ABOUT DIFFERENT THINGS: NOT AT ALL
GAD7 TOTAL SCORE: 0
5. BEING SO RESTLESS THAT IT IS HARD TO SIT STILL: NOT AT ALL
7. FEELING AFRAID AS IF SOMETHING AWFUL MIGHT HAPPEN: NOT AT ALL
4. TROUBLE RELAXING: NOT AT ALL
GAD7 TOTAL SCORE: 0
7. FEELING AFRAID AS IF SOMETHING AWFUL MIGHT HAPPEN: NOT AT ALL
2. NOT BEING ABLE TO STOP OR CONTROL WORRYING: NOT AT ALL
1. FEELING NERVOUS, ANXIOUS, OR ON EDGE: NOT AT ALL
6. BECOMING EASILY ANNOYED OR IRRITABLE: NOT AT ALL

## 2022-06-06 ASSESSMENT — PATIENT HEALTH QUESTIONNAIRE - PHQ9
SUM OF ALL RESPONSES TO PHQ QUESTIONS 1-9: 0
SUM OF ALL RESPONSES TO PHQ QUESTIONS 1-9: 0
10. IF YOU CHECKED OFF ANY PROBLEMS, HOW DIFFICULT HAVE THESE PROBLEMS MADE IT FOR YOU TO DO YOUR WORK, TAKE CARE OF THINGS AT HOME, OR GET ALONG WITH OTHER PEOPLE: NOT DIFFICULT AT ALL

## 2022-06-06 ASSESSMENT — PAIN SCALES - GENERAL: PAINLEVEL: NO PAIN (0)

## 2022-06-06 NOTE — PROGRESS NOTES
"   SUBJECTIVE:   CC: Jailene Mccoy is an 41 year old woman who presents for preventive health visit.     Nursing Notes:   Delia Farr LPN  6/6/2022  8:20 AM  Signed  Chief Complaint   Patient presents with     Physical       Initial /72   Pulse 72   Temp 96.8  F (36  C) (Tympanic)   Resp 16   Ht 1.708 m (5' 7.25\")   Wt 57.5 kg (126 lb 12.8 oz)   LMP 06/01/2022   SpO2 98%   Breastfeeding No   BMI 19.71 kg/m   Estimated body mass index is 19.71 kg/m  as calculated from the following:    Height as of this encounter: 1.708 m (5' 7.25\").    Weight as of this encounter: 57.5 kg (126 lb 12.8 oz).  Medication Reconciliation: complete    FOOD SECURITY SCREENING QUESTIONS  Hunger Vital Signs:  Within the past 12 months we worried whether our food would run out before we got money to buy more. Never  Within the past 12 months the food we bought just didn't last and we didn't have money to get more. Never        Advance care directive on file? no  Advance care directive provided to patient? yes     Delia Farr LPN         Patient has been advised of split billing requirements and indicates understanding: Yes  Healthy Habits:     Getting at least 3 servings of Calcium per day:  Yes    Bi-annual eye exam:  Yes    Dental care twice a year:  Yes    Sleep apnea or symptoms of sleep apnea:  None    Diet:  Regular (no restrictions)    Frequency of exercise:  2-3 days/week    Duration of exercise:  15-30 minutes    Taking medications regularly:  Yes    Medication side effects:  Not applicable    PHQ-2 Total Score: 0    Additional concerns today:  No          PROBLEMS TO ADD ON...  Hypertension Follow-up      Do you check your blood pressure regularly outside of the clinic? No     Are you following a low salt diet? No    Are your blood pressures ever more than 140 on the top number (systolic) OR more   than 90 on the bottom number (diastolic), for example 140/90? Not when checked this past week      Cold " sores - takes valtrex 4-5 times a year, has one now      FH of breast cancer in her mom - mammogram scheduled this month    Today's PHQ-2 Score:   PHQ-2 ( 1999 Pfizer) 6/6/2022   Q1: Little interest or pleasure in doing things 0   Q2: Feeling down, depressed or hopeless 0   PHQ-2 Score 0   PHQ-2 Total Score (12-17 Years)- Positive if 3 or more points; Administer PHQ-A if positive -   Q1: Little interest or pleasure in doing things Not at all   Q2: Feeling down, depressed or hopeless Not at all   PHQ-2 Score 0       Abuse: Current or Past (Physical, Sexual or Emotional) - No  Do you feel safe in your environment? Yes    Have you ever done Advance Care Planning? (For example, a Health Directive, POLST, or a discussion with a medical provider or your loved ones about your wishes): No, advance care planning information given to patient to review.  Patient declined advance care planning discussion at this time.    Social History     Tobacco Use     Smoking status: Never Smoker     Smokeless tobacco: Never Used   Substance Use Topics     Alcohol use: Yes     Comment: couple times per week on average     If you drink alcohol do you typically have >3 drinks per day or >7 drinks per week? No    Alcohol Use 6/6/2022   Prescreen: >3 drinks/day or >7 drinks/week? No   Prescreen: >3 drinks/day or >7 drinks/week? -       Reviewed orders with patient.  Reviewed health maintenance and updated orders accordingly - Yes      Breast Cancer Screening:  Any new diagnosis of family breast, ovarian, or bowel cancer?     FHS-7: No flowsheet data found.      Pertinent mammograms are reviewed under the imaging tab.    History of abnormal Pap smear: NO - age 30-65 PAP every 5 years with negative HPV co-testing recommended  PAP / HPV Latest Ref Rng & Units 5/12/2021   PAP (Historical) - NIL   HPV16 NEG:Negative Negative   HPV18 NEG:Negative Negative   HRHPV NEG:Negative Negative     Reviewed and updated as needed this visit by clinical staff    "Tobacco  Allergies  Meds   Med Hx  Surg Hx  Fam Hx  Soc Hx          Reviewed and updated as needed this visit by Provider                   Past Medical History:   Diagnosis Date     Attention-deficit hyperactivity disorder     teen onset/meds thru [facility]     Hepatic hemangioma 2021     Personal history of other medical treatment (CODE)     G2Para 2-0-0-2      Past Surgical History:   Procedure Laterality Date     BUNIONECTOMY      3-,left      SECTION      ,breech presentation     chin surgery  2008     HERNIORRHAPHY VENTRAL N/A 2018    Epigastric Ventral Hernia Repair & Laparoscopic Tubal Ligation;  Surgeon: Bobby Jones MD;  Location: [facility] OR     LAPAROSCOPIC TUBAL LIGATION Bilateral 2018    Procedure: LAPAROSCOPIC TUBAL LIGATION;;  Surgeon: Ike Krishnan MD;  Location: [facility] OR     wisdom teeth         Review of Systems  : moderately heavy periods, moderate cramping   DERM: acne is better  ADHD management by psychiatry      OBJECTIVE:   /72   Pulse 72   Temp 96.8  F (36  C) (Tympanic)   Resp 16   Ht 1.708 m (5' 7.25\")   Wt 57.5 kg (126 lb 12.8 oz)   LMP 2022   SpO2 98%   Breastfeeding No   BMI 19.71 kg/m    Physical Exam  GENERAL: healthy, alert and no distress  Wt reviewed  NECK: no adenopathy, no asymmetry, masses, or scars and thyroid normal to palpation  RESP: lungs clear to auscultation - no rales, rhonchi or wheezes  CV: regular rate and rhythm, normal S1 S2, no S3 or S4, no murmur, click or rub, no peripheral edema and peripheral pulses strong  BREAST - within normal limits   ABDOMEN: soft, nontender, no hepatosplenomegaly, no masses and bowel sounds normal  MS: no gross musculoskeletal defects noted, no edema        ASSESSMENT/PLAN:       ICD-10-CM    1. Physical exam, annual  Z00.00    2. Benign essential hypertension  I10 lisinopril-hydrochlorothiazide (ZESTORETIC) 20-25 MG tablet     Basic Metabolic Panel   3. Visit for screening " "mammogram  Z12.31    4. Menorrhagia with regular cycle  N92.0 norethindrone (MICRONOR) 0.35 MG tablet     Hemoglobin   5. Cold sore  B00.1 valACYclovir (VALTREX) 1000 mg tablet       1.  Cut back on hypertension medication - change to half tablet  2.  Labs due today  3.  Wishes to resume oral contraceptive pills for menstrual symptoms.  Was on depo in the past -  Good control of symptoms but negative metabolic effects.  Discussed that with hypertension, POP could be used.  Prescription for micronor is placed. Progesterone IUD could be considered.    4.  Valtrex refilled       COUNSELING:  Reviewed preventive health counseling, as reflected in patient instructions       Regular exercise       Healthy diet/nutrition       med reviewed    Estimated body mass index is 19.71 kg/m  as calculated from the following:    Height as of this encounter: 1.708 m (5' 7.25\").    Weight as of this encounter: 57.5 kg (126 lb 12.8 oz).        She reports that she has never smoked. She has never used smokeless tobacco.      Counseling Resources:  ATP IV Guidelines  Pooled Cohorts Equation Calculator  Breast Cancer Risk Calculator  BRCA-Related Cancer Risk Assessment: FHS-7 Tool  FRAX Risk Assessment  ICSI Preventive Guidelines  Dietary Guidelines for Americans, 2010  USDA's MyPlate  ASA Prophylaxis  Lung CA Screening    DAISY VAZ MD  [facility]  Answers for HPI/ROS submitted by the patient on 6/6/2022  If you checked off any problems, how difficult have these problems made it for you to do your work, take care of things at home, or get along with other people?: Not difficult at all  PHQ9 TOTAL SCORE: 0  BAYLEE 7 TOTAL SCORE: 0      "

## 2022-06-06 NOTE — NURSING NOTE
"Chief Complaint   Patient presents with     Physical       Initial /72   Pulse 72   Temp 96.8  F (36  C) (Tympanic)   Resp 16   Ht 1.708 m (5' 7.25\")   Wt 57.5 kg (126 lb 12.8 oz)   LMP 06/01/2022   SpO2 98%   Breastfeeding No   BMI 19.71 kg/m   Estimated body mass index is 19.71 kg/m  as calculated from the following:    Height as of this encounter: 1.708 m (5' 7.25\").    Weight as of this encounter: 57.5 kg (126 lb 12.8 oz).  Medication Reconciliation: complete    FOOD SECURITY SCREENING QUESTIONS  Hunger Vital Signs:  Within the past 12 months we worried whether our food would run out before we got money to buy more. Never  Within the past 12 months the food we bought just didn't last and we didn't have money to get more. Never        Advance care directive on file? no  Advance care directive provided to patient? yes     Delia Farr LPN  "

## 2022-07-27 ENCOUNTER — HOSPITAL ENCOUNTER (OUTPATIENT)
Dept: MAMMOGRAPHY | Facility: OTHER | Age: 42
Discharge: HOME | End: 2022-07-27
Attending: FAMILY MEDICINE | Admitting: FAMILY MEDICINE
Payer: COMMERCIAL

## 2022-07-27 DIAGNOSIS — Z12.31 VISIT FOR SCREENING MAMMOGRAM: ICD-10-CM

## 2022-07-27 PROCEDURE — 77067 SCR MAMMO BI INCL CAD: CPT

## 2023-03-14 ENCOUNTER — OFFICE VISIT (OUTPATIENT)
Dept: FAMILY MEDICINE | Facility: OTHER | Age: 43
End: 2023-03-14
Attending: FAMILY MEDICINE
Payer: COMMERCIAL

## 2023-03-14 VITALS
OXYGEN SATURATION: 100 % | WEIGHT: 132.6 LBS | DIASTOLIC BLOOD PRESSURE: 100 MMHG | HEART RATE: 102 BPM | BODY MASS INDEX: 20.61 KG/M2 | TEMPERATURE: 98 F | SYSTOLIC BLOOD PRESSURE: 164 MMHG

## 2023-03-14 DIAGNOSIS — R00.2 PALPITATIONS: Primary | ICD-10-CM

## 2023-03-14 DIAGNOSIS — R03.0 ELEVATED BLOOD PRESSURE READING: ICD-10-CM

## 2023-03-14 DIAGNOSIS — F41.9 ANXIETY: ICD-10-CM

## 2023-03-14 DIAGNOSIS — Z13.220 SCREENING FOR LIPID DISORDERS: ICD-10-CM

## 2023-03-14 DIAGNOSIS — R07.9 CHEST PAIN, UNSPECIFIED TYPE: ICD-10-CM

## 2023-03-14 PROCEDURE — 99214 OFFICE O/P EST MOD 30 MIN: CPT | Performed by: FAMILY MEDICINE

## 2023-03-14 ASSESSMENT — ENCOUNTER SYMPTOMS
WHEEZING: 0
CHEST TIGHTNESS: 1
CHILLS: 0
FEVER: 0
COUGH: 0
SHORTNESS OF BREATH: 0
NERVOUS/ANXIOUS: 1

## 2023-03-14 ASSESSMENT — PAIN SCALES - GENERAL: PAINLEVEL: NO PAIN (0)
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
## 2023-03-27 ENCOUNTER — HOSPITAL ENCOUNTER (OUTPATIENT)
Dept: CARDIOLOGY | Facility: OTHER | Age: 43
Discharge: HOME | End: 2023-03-27
Attending: FAMILY MEDICINE | Admitting: FAMILY MEDICINE
Payer: COMMERCIAL

## 2023-03-27 DIAGNOSIS — R00.2 PALPITATIONS: ICD-10-CM

## 2023-03-27 PROCEDURE — 999N000157 HC STATISTIC RCP TIME EA 10 MIN

## 2023-03-27 PROCEDURE — 93248 EXT ECG>7D<15D REV&INTERPJ: CPT | Performed by: INTERNAL MEDICINE

## 2023-03-27 PROCEDURE — 93246 EXT ECG>7D<15D RECORDING: CPT

## 2023-03-27 NOTE — PATIENT INSTRUCTIONS
Date Placed on Pt:  03/27/2023    Patient instructed not to:   -take baths, swim, sauna   -remove device prior to 14 days   -use electric blankets   -shower or sweat excessively within first 24 hours of device application  Patient instructed to:   -shower as needed   -be carefull when toweling off and dressing   -press button when cardiac symptoms occur   -document symptoms in log book   -remove and return device (send via mail to [email or web address])   -Call [employer] with any questions

## 2023-04-21 DIAGNOSIS — R07.9 CHEST PAIN, UNSPECIFIED TYPE: ICD-10-CM

## 2023-04-21 DIAGNOSIS — R00.2 PALPITATIONS: Primary | ICD-10-CM

## 2023-04-21 DIAGNOSIS — I45.6 WOLFF-PARKINSON-WHITE (WPW) PATTERN SEEN ON ELECTROCARDIOGRAPHY: ICD-10-CM

## 2023-04-22 ENCOUNTER — MYC REFILL (OUTPATIENT)
Dept: FAMILY MEDICINE | Facility: OTHER | Age: 43
End: 2023-04-22
Payer: COMMERCIAL

## 2023-04-22 DIAGNOSIS — B00.1 COLD SORE: ICD-10-CM

## 2023-04-24 ENCOUNTER — TELEPHONE (OUTPATIENT)
Dept: CARDIOLOGY | Facility: OTHER | Age: 43
End: 2023-04-24
Payer: COMMERCIAL

## 2023-04-24 DIAGNOSIS — I45.6 WPW (WOLFF-PARKINSON-WHITE SYNDROME): Primary | ICD-10-CM

## 2023-04-24 RX ORDER — VALACYCLOVIR HYDROCHLORIDE 1 G/1
1000 TABLET, FILM COATED ORAL 2 TIMES DAILY
Qty: 25 TABLET | Refills: 1 | Status: SHIPPED | OUTPATIENT
Start: 2023-04-24 | End: 2024-02-09

## 2023-04-24 NOTE — TELEPHONE ENCOUNTER
Pending Prescriptions:                       Disp   Refills    valACYclovir (VALTREX) 1000 mg tablet     25 tab*1            Sig: Take 1 tablet (1,000 mg) by mouth 2 times daily    Last OV- 3/14/23 with Dr. Rudy Lopez for chest pain and palpitations; 6/6/22 with Dr. Meng Olvera for physical     Last filled- 6/6/22 for 25 tablets with 1 refill     Next OV- none scheduled     Lizette Castillo CMA on 4/24/2023 at 8:21 AM

## 2023-04-24 NOTE — TELEPHONE ENCOUNTER
Per verbal recommendation from SAMIR Tapia CNP for Pt to have an echocardiogram done. Order place. Then to have a nurse visit with EKG to confirm WPW. And if needed will set up to see Dr. Hopkins    Called and spoke to Patient after verifying last name and date of birth.  Informed Pt of the above recommendation. Pt verbalized understanding and is in agreement with the plan.     Dee Rouse RN on 4/24/2023 at 2:42 PM

## 2023-04-28 ENCOUNTER — HOSPITAL ENCOUNTER (OUTPATIENT)
Dept: CARDIOLOGY | Facility: OTHER | Age: 43
Discharge: HOME | End: 2023-04-28
Attending: NURSE PRACTITIONER | Admitting: NURSE PRACTITIONER
Payer: COMMERCIAL

## 2023-04-28 DIAGNOSIS — I45.6 WPW (WOLFF-PARKINSON-WHITE SYNDROME): ICD-10-CM

## 2023-04-28 LAB — LVEF ECHO: NORMAL

## 2023-04-28 PROCEDURE — 93306 TTE W/DOPPLER COMPLETE: CPT | Mod: 26 | Performed by: STUDENT IN AN ORGANIZED HEALTH CARE EDUCATION/TRAINING PROGRAM

## 2023-04-28 PROCEDURE — 93306 TTE W/DOPPLER COMPLETE: CPT

## 2023-05-03 ENCOUNTER — TELEPHONE (OUTPATIENT)
Dept: CARDIOLOGY | Facility: OTHER | Age: 43
End: 2023-05-03
Payer: COMMERCIAL

## 2023-05-03 NOTE — TELEPHONE ENCOUNTER
Called and spoke to Patient after verifying last name and date of birth.  Scheduled Pt with a nurse visit with EKG for 5/8/23 at 11am. Pt verbalized understanding and has no other concerns at this time.     Dee Rouse RN on 5/3/2023 at 3:43 PM

## 2023-05-08 NOTE — PROGRESS NOTES
General Cardiology Clinic-[facility]    Referring provider: Kayla Dorantes MD    HPI: Ms. Jailene Mccoy is a 42 year old  female with PMH significant for  -Hepatic hemangioma  -Raynaud's phenomena  -ADHD  -Anxiety state    Patient is being seen today for palpitations.  She tells me she has history of palpitations for over 10 years.  5 years ago she had episodes lasting more than an hour. When she starts feeling palpitations she cannot walk or do anything.  She has learned how to self terminate her palpitations.  She usually sits down, pulls her knees towards her belly and takes deep breaths.  Usually it works .She has woken up in the middle of the night with palpitations.  No syncope, chest pain, shortness of breath, or dizziness.    Patient works as a teacher.  During the year she tells me she is under extreme stress.  She takes Adderall.  No change in the dose recently.  Drinks occasional coffee.  Does not smoke.    No prior history of cardiac disease.  Blood pressure elevated in clinic.  She is not on blood pressure medications.  No history of diabetes.      Reviewed recent echocardiogram 4/28/2023 which shows normal biventricular function with no significant valve disease.  Reviewed patient's recent Zio patch which shows nonsustained SVT (wide QRS, likely aberrancy) corresponding to palpitations.     Medications, personal, family, and social history reviewed with patient and revised.    PAST MEDICAL HISTORY:  Past Medical History:   Diagnosis Date     Attention-deficit hyperactivity disorder     teen onset/meds thru [facility]     Hepatic hemangioma 02/17/2021     Personal history of other medical treatment (CODE)     G2Para 2-0-0-2       CURRENT MEDICATIONS:  Current Outpatient Medications   Medication Sig Dispense Refill     amphetamine-dextroamphetamine (ADDERALL) 20 MG tablet Take 1 tablet (20 mg) by mouth daily       valACYclovir (VALTREX) 1000 mg tablet Take 1 tablet (1,000 mg) by mouth  "2 times daily 25 tablet 1     valACYclovir (VALTREX) 500 MG tablet Take 500 mg by mouth As needed for cold sores         PAST SURGICAL HISTORY:  Past Surgical History:   Procedure Laterality Date     BUNIONECTOMY      3-,left      SECTION      ,breech presentation     [facility] surgery  2008     HERNIORRHAPHY VENTRAL N/A 2018    Epigastric Ventral Hernia Repair & Laparoscopic Tubal Ligation;  Surgeon: Bobby Jones MD;  Location: [facility] OR     LAPAROSCOPIC TUBAL LIGATION Bilateral 2018    Procedure: LAPAROSCOPIC TUBAL LIGATION;;  Surgeon: Ike Krishnan MD;  Location: [facility] OR     wisdom teeth         ALLERGIES:   No Known Allergies    FAMILY HISTORY:  Family History   Problem Relation Age of Onset     Other - See Comments Mother         ADD     Breast Cancer Mother      Attention Deficit Disorder Sister      Breast Cancer Maternal Grandmother         Cancer-breast,and lung cancer     Hypertension Paternal Grandmother          SOCIAL HISTORY:  Social History     Tobacco Use     Smoking status: Never     Smokeless tobacco: Never   Vaping Use     Vaping status: Never Used   Substance Use Topics     Alcohol use: Yes     Comment: couple times per week on average     Drug use: No       ROS:   Constitutional: No fever, chills, or sweats. Weight stable.   Cardiovascular: As per HPI.       Exam:  BP (!) 132/92 (BP Location: Right arm, Patient Position: Sitting, Cuff Size: Adult Regular)   Pulse 94   Temp 97.8  F (36.6  C) (Temporal)   Resp 14   Ht 1.708 m (5' 7.25\")   Wt 59.8 kg (131 lb 12.8 oz)   LMP 2023 (Exact Date)   SpO2 100%   Breastfeeding No   BMI 20.49 kg/m    GENERAL APPEARANCE: alert and no distress  HEENT: no icterus, no central cyanosis  LYMPH/NECK: no adenopathy, no asymmetry, JVP not elevated.  RESPIRATORY: lungs clear to auscultation - no rales, rhonchi or wheezes, no use of accessory muscles, no retractions, respirations are unlabored, normal respiratory " rate  CARDIOVASCULAR: regular rhythm, normal S1, S2, no S3 or S4 and no murmur, click or rub, precordium quiet with normal PMI.  GI: soft, non tender  EXTREMITIES: no edema  NEURO: alert, normal speech,and affect  SKIN: no ecchymoses, no rashes     I have reviewed the labs and personally reviewed the imaging below and made my comment in the assessment and plan.    Labs:  CBC RESULTS:   Lab Results   Component Value Date    WBC 7.0 03/09/2021    RBC 3.91 03/09/2021    HGB 12.0 06/06/2022    HGB 12.2 03/09/2021    HCT 36.5 03/09/2021    MCV 93 03/09/2021    MCH 31.2 03/09/2021    MCHC 33.4 03/09/2021    RDW 12.6 03/09/2021     03/09/2021       BMP RESULTS:  Lab Results   Component Value Date     06/06/2022     03/09/2021    POTASSIUM 3.8 06/06/2022    POTASSIUM 3.6 03/09/2021    CHLORIDE 102 06/06/2022    CHLORIDE 104 03/09/2021    CO2 28 06/06/2022    CO2 25 03/09/2021    ANIONGAP 7 06/06/2022    ANIONGAP 9 03/09/2021    GLC 88 06/06/2022     (H) 03/09/2021    BUN 25 06/06/2022    BUN 23 03/09/2021    CR 0.98 06/06/2022    CR 0.94 03/09/2021    GFRESTIMATED 74 06/06/2022    GFRESTIMATED 66 03/09/2021    GFRESTBLACK 79 03/09/2021    ADELINA 9.1 06/06/2022    ADELINA 9.2 03/09/2021     I have personally reviewed the tests below.    Echocardiogram 4/28/2023  Global and regional left ventricular function is normal with an EF of 60-65%.  The right ventricle is normal size. Global right ventricular function is  normal.  No significant valvular abnormalities present.  IVC diameter <2.1 cm collapsing >50% with sniff suggests a normal RA pressure  of 3 mmHg.  No pericardial effusion is present.  There is no prior study for direct comparison.      I have reviewed EKG in clinic today shows sinus rhythm, short ND, no obvious delta wave.              Adult Leadless EKG Monitor 8 to 14 DaysPerformed 3/27/2023  Final result   Study Result  Procedure: Zio patch monitor    Findings: Patient's monitor was in place  for 14 days. Minimum heart rate 59 bpm, average heart rate 89 bpm, maximum heart rate 169 bpm. Rhythm/s present include sinus rhythm, junctional rhythm, supraventricular tachycardia. Delta waves consistent with likely James-Parkinson-White are noted throughout the recording. There were rare ventricular ectopic beats accounting for <1% of all beats. There were rare ventricular triplets and rare couplets. There were rare supraventricular ectopic beats. There were 18 triggered events and 9 diary entries during which time the noted rhythms were supraventricular tachycardia, sinus rhythm, junctional rhythm, supraventricular and ventricular ectopy. Review of actual tracings showed no other significant abnormality.    Impression: Zio patch monitor showing predominantly sinus rhythm with James-Parkinson-White pattern noted throughout and rare symptomatic ectopy.    Echocardiogram 4/28/2023  Global and regional left ventricular function is normal with an EF of 60-65%.  The right ventricle is normal size. Global right ventricular function is  normal.  No significant valvular abnormalities present.  IVC diameter <2.1 cm collapsing >50% with sniff suggests a normal RA pressure  of 3 mmHg.  No pericardial effusion is present.  There is no prior study for direct comparison    Assessment and Plan:     #Palpitations  #James-Parkinson-White syndrome?  #EKG with short NM  #Documented nonsustained SVT episode corresponded to palpitations by Zio patch  -Patient has history of palpitations over the last 10 years.  She has had episodes lasting over an hour which she was able to self terminate  (vagal maneuver, she takes deep breaths).  Zio patch showed a short nonsustained episode of wide QRS tachycardia (QRS morphology appears to to have delta waves, antidromic tachycardia?) corresponding to her symptoms.  I have reviewed patient's EKG in clinic today.  There is no obvious delta wave however NM interval is short at 109 ms.  Patient  might have a left lateral accessory pathway which does not show as an apparent delta wave on the EKG.  I have discussed with the patient EP study/possible ablation versus medical treatment.  She would like to try medical treatment first.  In the meantime I think she should come off Adderall.  The patient is agreeable with the plan.    #Hypertension?  -Patient works as a teacher and she tells me that there is extreme stress at work and at home as well.  Blood pressure is elevated in clinic today.  I am not sure if this is true hypertension versus stress related high blood pressure.  I recommended patient to monitor blood pressure at home and keep a log of it.      Plan:  -Patient will reach out to primary care physician to wean her off of Adderall.  -Start metoprolol 25 mg daily  -Current clinic 6 months for follow-up    Total time spent today for this visit is 60 minutes including precharting, face-to-face clinic visit, review of labs/imaging and medical documentation.    Please donot hesitate to contact me if you have any questions or concerns. Again, thank you for allowing me to participate in the care of your patient.    Med LIM MD  [facility] Division of Cardiology  Pager [phone]

## 2023-05-09 ENCOUNTER — OFFICE VISIT (OUTPATIENT)
Dept: CARDIOLOGY | Facility: OTHER | Age: 43
End: 2023-05-09
Attending: INTERNAL MEDICINE
Payer: COMMERCIAL

## 2023-05-09 VITALS
BODY MASS INDEX: 20.69 KG/M2 | RESPIRATION RATE: 14 BRPM | HEART RATE: 94 BPM | OXYGEN SATURATION: 100 % | SYSTOLIC BLOOD PRESSURE: 134 MMHG | HEIGHT: 67 IN | TEMPERATURE: 97.8 F | WEIGHT: 131.8 LBS | DIASTOLIC BLOOD PRESSURE: 90 MMHG

## 2023-05-09 DIAGNOSIS — I10 HYPERTENSION, UNSPECIFIED TYPE: ICD-10-CM

## 2023-05-09 DIAGNOSIS — R94.31 SHORTENED PR INTERVAL: ICD-10-CM

## 2023-05-09 DIAGNOSIS — I45.6 WOLFF-PARKINSON-WHITE (WPW) PATTERN SEEN ON ELECTROCARDIOGRAPHY: ICD-10-CM

## 2023-05-09 DIAGNOSIS — R00.2 PALPITATIONS: Primary | ICD-10-CM

## 2023-05-09 PROCEDURE — 99205 OFFICE O/P NEW HI 60 MIN: CPT | Performed by: INTERNAL MEDICINE

## 2023-05-09 PROCEDURE — 93000 ELECTROCARDIOGRAM COMPLETE: CPT | Mod: 76 | Performed by: INTERNAL MEDICINE

## 2023-05-09 RX ORDER — METOPROLOL SUCCINATE 25 MG/1
25 TABLET, EXTENDED RELEASE ORAL DAILY
Qty: 90 TABLET | Refills: 1 | Status: SHIPPED | OUTPATIENT
Start: 2023-05-09 | End: 2024-02-09

## 2023-05-09 RX ORDER — DEXTROAMPHETAMINE SACCHARATE, AMPHETAMINE ASPARTATE, DEXTROAMPHETAMINE SULFATE AND AMPHETAMINE SULFATE 2.5; 2.5; 2.5; 2.5 MG/1; MG/1; MG/1; MG/1
TABLET ORAL
COMMUNITY
Start: 2023-04-12 | End: 2024-02-09

## 2023-05-09 RX ORDER — DEXTROAMPHETAMINE SULFATE, DEXTROAMPHETAMINE SACCHARATE, AMPHETAMINE SULFATE AND AMPHETAMINE ASPARTATE 7.5; 7.5; 7.5; 7.5 MG/1; MG/1; MG/1; MG/1
CAPSULE, EXTENDED RELEASE ORAL
COMMUNITY
Start: 2023-04-11 | End: 2024-02-09

## 2023-05-09 ASSESSMENT — PAIN SCALES - GENERAL: PAINLEVEL: NO PAIN (0)

## 2023-05-09 NOTE — NURSING NOTE
"Chief Complaint   Patient presents with     New Patient     Chest pain, WPW       Initial BP (!) 132/92 (BP Location: Right arm, Patient Position: Sitting, Cuff Size: Adult Regular)   Pulse 94   Temp 97.8  F (36.6  C) (Temporal)   Resp 14   Wt 59.8 kg (131 lb 12.8 oz)   LMP 05/08/2023 (Exact Date)   SpO2 100%   Breastfeeding No   BMI 20.49 kg/m   Estimated body mass index is 20.49 kg/m  as calculated from the following:    Height as of 6/6/22: 1.708 m (5' 7.25\").    Weight as of this encounter: 59.8 kg (131 lb 12.8 oz).     Medication Reconciliation: complete    Tobacco User : No  Patient is not on a daily aspirin  Patient is not on a Statin.  Blood pressure today of:     FOOD SECURITY SCREENING QUESTIONS:    The next two questions are to help us understand your food security.  If you are feeling you need any assistance in this area, we have resources available to support you today.    Hunger Vital Signs:  Within the past 12 months we worried whether our food would run out before we got money to buy more. Never  Within the past 12 months the food we bought just didn't last and we didn't have money to get more. Never        Advance care plan reviewed      Bhavani Alfonso LPN on 5/9/2023 at 1:11 PM      "

## 2023-05-13 LAB
ATRIAL RATE - MUSE: 90 BPM
DIASTOLIC BLOOD PRESSURE - MUSE: NORMAL MMHG
INTERPRETATION ECG - MUSE: NORMAL
P AXIS - MUSE: 49 DEGREES
PR INTERVAL - MUSE: 106 MS
QRS DURATION - MUSE: 90 MS
QT - MUSE: 352 MS
QTC - MUSE: 430 MS
R AXIS - MUSE: 72 DEGREES
SYSTOLIC BLOOD PRESSURE - MUSE: NORMAL MMHG
T AXIS - MUSE: 51 DEGREES
VENTRICULAR RATE- MUSE: 90 BPM

## 2023-10-26 ENCOUNTER — HOSPITAL ENCOUNTER (OUTPATIENT)
Dept: MAMMOGRAPHY | Facility: OTHER | Age: 43
Discharge: HOME | End: 2023-10-26
Attending: FAMILY MEDICINE | Admitting: FAMILY MEDICINE
Payer: COMMERCIAL

## 2023-10-26 DIAGNOSIS — Z12.31 VISIT FOR SCREENING MAMMOGRAM: ICD-10-CM

## 2023-10-26 PROCEDURE — 77067 SCR MAMMO BI INCL CAD: CPT

## 2024-01-19 ENCOUNTER — OFFICE VISIT (OUTPATIENT)
Dept: FAMILY MEDICINE | Facility: OTHER | Age: 44
End: 2024-01-19
Attending: FAMILY MEDICINE
Payer: COMMERCIAL

## 2024-01-19 VITALS
WEIGHT: 130.8 LBS | HEIGHT: 67 IN | HEART RATE: 77 BPM | OXYGEN SATURATION: 100 % | DIASTOLIC BLOOD PRESSURE: 80 MMHG | TEMPERATURE: 98.7 F | SYSTOLIC BLOOD PRESSURE: 122 MMHG | RESPIRATION RATE: 16 BRPM | BODY MASS INDEX: 20.53 KG/M2

## 2024-01-19 DIAGNOSIS — J02.9 SORE THROAT: ICD-10-CM

## 2024-01-19 DIAGNOSIS — R05.9 COUGH, UNSPECIFIED TYPE: Primary | ICD-10-CM

## 2024-01-19 LAB
FLUAV RNA SPEC QL NAA+PROBE: POSITIVE
FLUBV RNA RESP QL NAA+PROBE: NEGATIVE
GROUP A STREP BY PCR: NOT DETECTED
RSV RNA SPEC NAA+PROBE: NEGATIVE
SARS-COV-2 RNA RESP QL NAA+PROBE: NEGATIVE

## 2024-01-19 PROCEDURE — 99213 OFFICE O/P EST LOW 20 MIN: CPT | Performed by: FAMILY MEDICINE

## 2024-01-19 PROCEDURE — 87637 SARSCOV2&INF A&B&RSV AMP PRB: CPT | Mod: ZL | Performed by: FAMILY MEDICINE

## 2024-01-19 PROCEDURE — 87651 STREP A DNA AMP PROBE: CPT | Mod: ZL | Performed by: FAMILY MEDICINE

## 2024-01-19 RX ORDER — BENZONATATE 100 MG/1
100 CAPSULE ORAL 3 TIMES DAILY PRN
Qty: 20 CAPSULE | Refills: 1 | Status: SHIPPED | OUTPATIENT
Start: 2024-01-19 | End: 2024-02-09

## 2024-01-19 RX ORDER — CODEINE PHOSPHATE AND GUAIFENESIN 10; 100 MG/5ML; MG/5ML
1-2 SOLUTION ORAL EVERY 4 HOURS PRN
Qty: 180 ML | Refills: 0 | Status: SHIPPED | OUTPATIENT
Start: 2024-01-19 | End: 2024-02-09

## 2024-01-19 ASSESSMENT — PAIN SCALES - GENERAL: PAINLEVEL: MILD PAIN (3)

## 2024-01-19 ASSESSMENT — ENCOUNTER SYMPTOMS
SORE THROAT: 1
COUGH: 1

## 2024-01-19 NOTE — NURSING NOTE
"Chief Complaint   Patient presents with    Pharyngitis    Respiratory Problems     Chest congestion     Cough       Initial /80   Pulse 77   Temp 98.7  F (37.1  C) (Tympanic)   Resp 16   Ht 1.708 m (5' 7.25\")   Wt 59.3 kg (130 lb 12.8 oz)   LMP 12/07/2023 (Exact Date)   SpO2 100%   Breastfeeding No   BMI 20.33 kg/m   Estimated body mass index is 20.33 kg/m  as calculated from the following:    Height as of this encounter: 1.708 m (5' 7.25\").    Weight as of this encounter: 59.3 kg (130 lb 12.8 oz).  Medication Review: complete    The next two questions are to help us understand your food security.  If you are feeling you need any assistance in this area, we have resources available to support you today.          1/19/2024   SDOH- Food Insecurity   Within the past 12 months, did you worry that your food would run out before you got money to buy more? N   Within the past 12 months, did the food you bought just not last and you didn t have money to get more? N         Health Care Directive:  Patient does not have a Health Care Directive or Living Will: Discussed advance care planning with patient; however, patient declined at this time.    Lizette Castillo CMA    Patient swabbed for COVID-19 testing.  Lizette Castillo CMA on 1/19/2024 at 11:29 AM          "

## 2024-01-19 NOTE — PROGRESS NOTES
"  Assessment & Plan       ICD-10-CM    1. Cough, unspecified type  R05.9 Group A Streptococcus PCR Throat Swab     Symptomatic Influenza A/B, RSV, & SARS-CoV2 PCR (COVID-19) Nose     guaiFENesin-codeine (ROBITUSSIN AC) 100-10 MG/5ML solution     benzonatate (TESSALON) 100 MG capsule      2. Sore throat  J02.9 Group A Streptococcus PCR Throat Swab     Symptomatic Influenza A/B, RSV, & SARS-CoV2 PCR (COVID-19) Nose        Above pending.   Discussed symptomatic management.       No follow-ups on file.    Jesica Dent is a 43 year old, presenting for the following health issues:  Pharyngitis, Respiratory Problems (Chest congestion ), and Cough        1/19/2024    11:22 AM   Additional Questions   Roomed by VALERIE Bauer   Accompanied by Self     History of Present Illness       Reason for visit:  Strep test  Symptom onset:  1-3 days ago  Symptoms include:  Cough, chest congestion    She eats 2-3 servings of fruits and vegetables daily.She consumes 1 sweetened beverage(s) daily.She exercises with enough effort to increase her heart rate 20 to 29 minutes per day.  She exercises with enough effort to increase her heart rate 3 or less days per week.   She is taking medications regularly.       Acute Illness  Acute illness concerns: Sore throat, cough, chest congestion   Onset/Duration: 4-5 days   Symptoms:  Fever: No  Chills/Sweats: YES  Headache (location?): YES  Sinus Pressure: No  Conjunctivitis:  No  Ear Pain: no  Rhinorrhea: YES  Congestion: YES  Sore Throat: YES  Cough: YES-productive of yellow sputum  Wheeze: No  Decreased Appetite: No  Nausea: No  Vomiting: No  Diarrhea: No  Dysuria/Freq.: No  Dysuria or Hematuria: No  Fatigue/Achiness: YES  Sick/Strep Exposure: YES  Therapies tried and outcome: Dayquil, Nyquil, Ibuprofen, Mucinex             Objective    /80   Pulse 77   Temp 98.7  F (37.1  C) (Tympanic)   Resp 16   Ht 1.708 m (5' 7.25\")   Wt 59.3 kg (130 lb 12.8 oz)   LMP 12/07/2023 (Exact Date) "   SpO2 100%   Breastfeeding No   BMI 20.33 kg/m    Body mass index is 20.33 kg/m .  Physical Exam  Constitutional:       Appearance: She is well-developed.   Eyes:      Conjunctiva/sclera: Conjunctivae normal.   Neck:      Thyroid: No thyromegaly.   Cardiovascular:      Rate and Rhythm: Normal rate and regular rhythm.      Heart sounds: Normal heart sounds. No murmur heard.  Pulmonary:      Effort: Pulmonary effort is normal. No respiratory distress.      Breath sounds: Normal breath sounds.   Abdominal:      Palpations: Abdomen is soft.   Lymphadenopathy:      Cervical: No cervical adenopathy.   Skin:     Findings: No rash.   Neurological:      Mental Status: She is alert and oriented to person, place, and time.                Signed Electronically by: Arianna Gilman MD

## 2024-01-31 ENCOUNTER — NURSE TRIAGE (OUTPATIENT)
Dept: FAMILY MEDICINE | Facility: OTHER | Age: 44
End: 2024-01-31
Payer: COMMERCIAL

## 2024-01-31 NOTE — TELEPHONE ENCOUNTER
Patient will call in am and make a same day appointment to be seen.    Reshma Parker RN on 1/31/2024 at 12:34 PM

## 2024-01-31 NOTE — TELEPHONE ENCOUNTER
S-(situation): patient isn't feeling better, cough continues    B-(background): Patient was seen 1/19/24 and tested positive for influenza A. Patient is still coughing, not feeling good,losing her voice, short of breath. Has family member sick with pneumonia. Patient has tried the medications prescribed at  her appointment but they didn't work well. Now not sleeping. Has humidifier in room    A-(assessment): Continued URI symptoms.     R-(recommendations): Will route to provider for recommendations    Reshma Parker RN on 1/31/2024 at 11:02 AM

## 2024-01-31 NOTE — TELEPHONE ENCOUNTER
Reason for call: Medication or medication refill    Name of medication requested: antibiotics for possible pneumonia - still coughing for 16 day, unable to sleep    How many days of medication do you have left? NEW    What pharmacy do you use? [facility]    Preferred method for responding to this message: Telephone Call    Phone number patient can be reached at: Cell number on file:    Telephone Information:   Mobile [phone]       If we cannot reach you directly, may we leave a detailed response at the number you provided? Yes        Lindsey Celestin on 1/31/2024 at 9:48 AM

## 2024-02-09 ENCOUNTER — OFFICE VISIT (OUTPATIENT)
Dept: FAMILY MEDICINE | Facility: OTHER | Age: 44
End: 2024-02-09
Attending: NURSE PRACTITIONER
Payer: COMMERCIAL

## 2024-02-09 VITALS
HEART RATE: 104 BPM | RESPIRATION RATE: 20 BRPM | OXYGEN SATURATION: 99 % | BODY MASS INDEX: 20.4 KG/M2 | DIASTOLIC BLOOD PRESSURE: 94 MMHG | WEIGHT: 130 LBS | TEMPERATURE: 98 F | HEIGHT: 67 IN | SYSTOLIC BLOOD PRESSURE: 148 MMHG

## 2024-02-09 DIAGNOSIS — J22 LOWER RESPIRATORY INFECTION (E.G., BRONCHITIS, PNEUMONIA, PNEUMONITIS, PULMONITIS): Primary | ICD-10-CM

## 2024-02-09 PROCEDURE — 99213 OFFICE O/P EST LOW 20 MIN: CPT | Performed by: NURSE PRACTITIONER

## 2024-02-09 RX ORDER — AZITHROMYCIN 250 MG/1
TABLET, FILM COATED ORAL
Qty: 6 TABLET | Refills: 0 | Status: SHIPPED | OUTPATIENT
Start: 2024-02-09 | End: 2024-02-14

## 2024-02-09 ASSESSMENT — PAIN SCALES - GENERAL: PAINLEVEL: NO PAIN (0)

## 2024-02-09 ASSESSMENT — ENCOUNTER SYMPTOMS: COUGH: 1

## 2024-02-09 NOTE — PROGRESS NOTES
Assessment & Plan   Problem List Items Addressed This Visit    None  Visit Diagnoses       Lower respiratory infection (e.g., bronchitis, pneumonia, pneumonitis, pulmonitis)    -  Primary    Relevant Medications    azithromycin (ZITHROMAX) 250 MG tablet        We discussed possibility of bacterial infection related to previous influenza A versus secondary infection such as COVID, RSV and/or strep throat given her exposures.  Discussed possibility of testing versus treatment.  At this time she would like to move forward with antibiotic treatment and avoid testing.  Azithromycin was sent to pharmacy, follow-up as needed.    Prescription drug management           No follow-ups on file.      Jesica Dent is a 43 year old, presenting for the following health issues:  Cough    History of Present Illness       Reason for visit:  Cough  Symptom onset:  More than a month  Symptoms include:  Cough  Symptom intensity:  Severe  Symptom progression:  Worsening  Had these symptoms before:  Yes  Has tried/received treatment for these symptoms:  Yes  Previous treatment was successful:  No    She eats 2-3 servings of fruits and vegetables daily.She consumes 1 sweetened beverage(s) daily.She exercises with enough effort to increase her heart rate 20 to 29 minutes per day.  She exercises with enough effort to increase her heart rate 4 days per week.   She is taking medications regularly.     She comes to clinic today for follow-up on cough.  She was diagnosed with influenza 3 to 4 weeks ago.  She reports cough has continued, not significantly improved over this timeframe.  Hoarseness in her voice, congestion and sore throat have waxed and waned.  She is not having any fevers.  Cough seems to be worse at night.  She has had multiple exposures to COVID, flu, RSV and strep as she is a  locally.  She has been using multiple over-the-counter medications for symptomatic management.      Review of Systems  See  "above      Objective    BP (!) 148/94   Pulse 104   Temp 98  F (36.7  C)   Resp 20   Ht 1.708 m (5' 7.25\")   Wt 59 kg (130 lb)   LMP 12/07/2023 (Exact Date)   SpO2 99%   BMI 20.21 kg/m    Body mass index is 20.21 kg/m .  Physical Exam   GENERAL: alert and no distress  EYES: Eyes grossly normal to inspection, PERRL and conjunctivae and sclerae normal  HENT: ear canals and TM's normal, nose and mouth without ulcers or lesions.  Hoarse voice  NECK: no adenopathy, no asymmetry, masses, or scars  RESP: lungs clear to auscultation - no rales, rhonchi or wheezes, frequent cough, no respiratory distress  CV: regular rate and rhythm, normal S1 S2  PSYCH: mentation appears normal, affect normal/bright            Signed Electronically by: SAMIR Aquino CNP    "

## 2024-02-09 NOTE — NURSING NOTE
Patient presents today for shortness of breath and wheezing. Patient is a  and has had multiple kids out of school with illness.    Medication Reconciliation Complete    Karen Kahn LPN  2/9/2024 2:39 PM

## 2024-02-29 ENCOUNTER — MYC MEDICAL ADVICE (OUTPATIENT)
Dept: FAMILY MEDICINE | Facility: OTHER | Age: 44
End: 2024-02-29
Payer: COMMERCIAL

## 2024-02-29 DIAGNOSIS — B00.1 COLD SORE: ICD-10-CM

## 2024-02-29 RX ORDER — VALACYCLOVIR HYDROCHLORIDE 1 G/1
1000 TABLET, FILM COATED ORAL 2 TIMES DAILY
Qty: 25 TABLET | Refills: 1 | Status: SHIPPED | OUTPATIENT
Start: 2024-02-29

## 2024-02-29 NOTE — TELEPHONE ENCOUNTER
Requested Prescriptions   Pending Prescriptions Disp Refills    valACYclovir (VALTREX) 1000 mg tablet 25 tablet 1     Sig: Take 1 tablet (1,000 mg) by mouth 2 times daily       There is no refill protocol information for this order          Last Prescription Date:   4/24/2023  Last Fill Qty/Refills:         25, R-1    Last Office Visit:              2/9/2024   Future Office visit:           None    Cheryl Malloy RN on 2/29/2024 at 12:12 PM

## 2024-03-03 RX ORDER — VALACYCLOVIR HYDROCHLORIDE 1 G/1
TABLET, FILM COATED ORAL
Qty: 25 TABLET | Refills: 1 | Status: SHIPPED | OUTPATIENT
Start: 2024-03-03

## 2024-03-18 ENCOUNTER — OFFICE VISIT (OUTPATIENT)
Dept: FAMILY MEDICINE | Facility: OTHER | Age: 44
End: 2024-03-18
Attending: STUDENT IN AN ORGANIZED HEALTH CARE EDUCATION/TRAINING PROGRAM
Payer: COMMERCIAL

## 2024-03-18 VITALS
TEMPERATURE: 97.7 F | DIASTOLIC BLOOD PRESSURE: 94 MMHG | HEIGHT: 67 IN | HEART RATE: 88 BPM | BODY MASS INDEX: 20.25 KG/M2 | SYSTOLIC BLOOD PRESSURE: 148 MMHG | WEIGHT: 129 LBS | OXYGEN SATURATION: 99 % | RESPIRATION RATE: 12 BRPM

## 2024-03-18 DIAGNOSIS — J02.0 STREPTOCOCCAL PHARYNGITIS: Primary | ICD-10-CM

## 2024-03-18 DIAGNOSIS — J02.9 SORE THROAT: ICD-10-CM

## 2024-03-18 DIAGNOSIS — Z20.818 STREPTOCOCCUS EXPOSURE: ICD-10-CM

## 2024-03-18 DIAGNOSIS — R53.83 FATIGUE, UNSPECIFIED TYPE: ICD-10-CM

## 2024-03-18 LAB — GROUP A STREP BY PCR: DETECTED

## 2024-03-18 PROCEDURE — 87651 STREP A DNA AMP PROBE: CPT | Mod: ZL

## 2024-03-18 PROCEDURE — 99213 OFFICE O/P EST LOW 20 MIN: CPT

## 2024-03-18 PROCEDURE — 250N000011 HC RX IP 250 OP 636

## 2024-03-18 PROCEDURE — 96372 THER/PROPH/DIAG INJ SC/IM: CPT

## 2024-03-18 RX ADMIN — PENICILLIN G BENZATHINE 1.2 MILLION UNITS: 1200000 INJECTION, SUSPENSION INTRAMUSCULAR at 12:56

## 2024-03-18 ASSESSMENT — PAIN SCALES - GENERAL: PAINLEVEL: MILD PAIN (3)

## 2024-03-18 NOTE — NURSING NOTE
Clinic Administered Medication Documentation        Patient was given PENICILLIN G LA. Prior to medication administration, verified patient's identity using patient s name and date of birth. Please see MAR and medication order for additional information. Patient instructed to remain in clinic for 15 minutes and report any adverse reaction to staff immediately.    Vial/Syringe: Syringe  Lien Moody CMA([facility])..................3/18/2024   12:58 PM

## 2024-03-18 NOTE — PROGRESS NOTES
ASSESSMENT/PLAN:    I have reviewed the nursing notes.  I have reviewed the findings, diagnosis, plan and need for follow up with the patient.    1. Streptococcus exposure  2. Sore throat  3. Fatigue, unspecified type    - Group A Streptococcus PCR Throat Swab-positive strep test    4. Streptococcal pharyngitis    - penicillin G benzathine (BICILLIN L-A) injection 1.2 Million Units-administered in clinic    - You will be contagious for the next 24 hours.    - Change to new toothbrush tomorrow.    - Symptomatic treatment - Encouraged fluids, salt water gargles, honey (only if greater than 1 year in age due to risk of botulism), elevation, humidifier, sinus rinse/netti pot, lozenges, tea, topical vapor rub, popsicles, rest, etc     - May use over-the-counter Tylenol or ibuprofen PRN    - Discussed warning signs/symptoms indicative of need to f/u    - Follow up if symptoms persist or worsen or concerns    - I explained my diagnostic considerations and recommendations to the patient, who voiced understanding and agreement with the treatment plan. All questions were answered. We discussed potential side effects of any prescribed or recommended therapies, as well as expectations for response to treatments.    Jo Ann Khan, SAMIR CNP  3/18/2024  12:17 PM    HPI:    Jailene Mccoy is a 43 year old female who presents to [facility] today for concerns of sore throat and fatigue since early  morning.  Has been around family members recently that have had positive strep.  Denies shortness of breath, fever, headaches, chest pain, dizziness, lightheadedness, nausea, vomiting, diarrhea.    Past Medical History:   Diagnosis Date    Attention-deficit hyperactivity disorder     teen onset/meds thru [facility]    Hepatic hemangioma 2021    Personal history of other medical treatment (CODE)     G2Para 2-0-0-2     Past Surgical History:   Procedure Laterality Date    BUNIONECTOMY      3-,left     SECTION       "2009,breech presentation    chin surgery  06/2008    HERNIORRHAPHY VENTRAL N/A 04/13/2018    Epigastric Ventral Hernia Repair & Laparoscopic Tubal Ligation;  Surgeon: Bobby Jones MD;  Location: [facility] OR    LAPAROSCOPIC TUBAL LIGATION Bilateral 04/13/2018    Procedure: LAPAROSCOPIC TUBAL LIGATION;;  Surgeon: Ike Krishnan MD;  Location: [facility] OR    wisdom teeth       Social History     Tobacco Use    Smoking status: Never    Smokeless tobacco: Never   Substance Use Topics    Alcohol use: Yes     Comment: couple times per week on average     Current Outpatient Medications   Medication Sig Dispense Refill    amphetamine-dextroamphetamine (ADDERALL) 20 MG tablet Take 1 tablet (20 mg) by mouth daily      valACYclovir (VALTREX) 1000 mg tablet TAKE 1 TABLET(1000 MG) BY MOUTH TWICE DAILY FOR 5 DAYS 25 tablet 1    valACYclovir (VALTREX) 1000 mg tablet Take 1 tablet (1,000 mg) by mouth 2 times daily 25 tablet 1     No Known Allergies  Past medical history, past surgical history, current medications and allergies reviewed and accurate to the best of my knowledge.      ROS:  Refer to HPI    BP (!) 148/94 (BP Location: Right arm, Patient Position: Sitting, Cuff Size: Adult Regular)   Pulse 88   Temp 97.7  F (36.5  C) (Tympanic)   Resp 12   Ht 1.702 m (5' 7\")   Wt 58.5 kg (129 lb)   LMP 03/05/2024 (Exact Date)   SpO2 99%   BMI 20.20 kg/m      EXAM:  General Appearance: Well appearing 43 year old female, appropriate appearance for age. No acute distress   Ears: Left TM intact, translucent with bony landmarks appreciated, no erythema, no effusion, no bulging, no purulence.  Right TM intact, translucent with bony landmarks appreciated, no erythema, no effusion, no bulging, no purulence.  Left auditory canal clear.  Right auditory canal clear.  Normal external ears, non tender.  Eyes: conjunctivae normal without erythema or irritation, corneas clear, no drainage or crusting, no eyelid swelling, pupils equal   Oropharynx: " moist mucous membranes, posterior pharynx with erythema, tonsils symmetric and 1+, + erythema, no exudates or petechiae, no post nasal drip seen, no trismus, voice clear.    Sinuses:  No sinus tenderness upon palpation of the frontal or maxillary sinuses  Nose:  Bilateral nares: no erythema, no edema, no drainage or congestion   Neck: supple with adenopathy anterior cervical    Respiratory: normal chest wall and respirations.  Normal effort.  Clear to auscultation bilaterally, no wheezing, crackles or rhonchi.  No increased work of breathing.  No cough appreciated.  Cardiac: RRR with no murmurs  Abdomen: soft, nontender, no rigidity, no rebound tenderness or guarding, normal bowel sounds present   Musculoskeletal:  Equal movement of bilateral upper extremities.  Equal movement of bilateral lower extremities.  Normal gait.    Neuro: Alert and oriented to person, place, and time.    Psychological: normal affect, alert, oriented, and pleasant.     Labs:  Results for orders placed or performed in visit on 03/18/24   Group A Streptococcus PCR Throat Swab     Status: Abnormal    Specimen: Throat; Swab   Result Value Ref Range    Group A strep by PCR Detected (A) Not Detected    Narrative    The Xpert Xpress Strep A test, performed on the [employer]  Instrument Systems, is a rapid, qualitative in vitro diagnostic test for the detection of Streptococcus pyogenes (Group A ß-hemolytic Streptococcus, Strep A) in throat swab specimens from patients with signs and symptoms of pharyngitis. The Xpert Xpress Strep A test can be used as an aid in the diagnosis of Group A Streptococcal pharyngitis. The assay is not intended to monitor treatment for Group A Streptococcus infections. The Xpert Xpress Strep A test utilizes an automated real-time polymerase chain reaction (PCR) to detect Streptococcus pyogenes DNA.

## 2024-03-18 NOTE — NURSING NOTE
"Pt presents to  for sore throat since yesterday morning. 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 If pt is positive, she would like to get a shot for treatment.    Chief Complaint   Patient presents with    Pharyngitis       FOOD SECURITY SCREENING QUESTIONS  Hunger Vital Signs:  Within the past 12 months we worried whether our food would run out before we got money to buy more. Never  Within the past 12 months the food we bought just didn't last and we didn't have money to get more. Never  Mariana Deajanon 3/18/2024 12:08 PM      Initial BP (!) 148/94 (BP Location: Right arm, Patient Position: Sitting, Cuff Size: Adult Regular)   Pulse 88   Temp 97.7  F (36.5  C) (Tympanic)   Resp 12   Ht 1.702 m (5' 7\")   Wt 58.5 kg (129 lb)   LMP 03/05/2024 (Exact Date)   SpO2 99%   BMI 20.20 kg/m   Estimated body mass index is 20.2 kg/m  as calculated from the following:    Height as of this encounter: 1.702 m (5' 7\").    Weight as of this encounter: 58.5 kg (129 lb).  Medication Reconciliation: complete    Mariana Ramires    "

## 2024-03-18 NOTE — PATIENT INSTRUCTIONS
Strep Throat: Care Instructions  Overview     Strep throat is a bacterial infection that causes sudden, severe sore throat and fever. Symptoms may include red, swollen tonsils that may have white or yellow patches. A strep test may be needed to tell if the sore throat is caused by strep bacteria. Treatment can help ease symptoms and may prevent future problems.  Follow-up care is a key part of your treatment and safety. Be sure to make and go to all appointments, and call your doctor if you are having problems. It's also a good idea to know your test results and keep a list of the medicines you take.  How can you care for yourself at home?  Take your antibiotics as directed. Do not stop taking them just because you feel better. You need to take the full course of antibiotics.  Strep throat can spread to others until 24 hours after you begin taking antibiotics. During this time, avoid contact with other people at work, school, or home, especially infants and children. Do not sneeze or cough on others, and wash your hands often. Keep your drinking glass and eating utensils separate from those of others. Wash these items well in hot, soapy water.  Gargle with warm salt water several times a day to help reduce swelling and relieve pain. Mix 1/2 teaspoon of salt in 1 cup of warm water.  Take an over-the-counter pain medication, such as acetaminophen (Tylenol), ibuprofen (Advil, Motrin), or naproxen (Aleve). Read and follow all instructions on the label.  Try an over-the-counter anesthetic throat spray or throat lozenges, which may help relieve throat pain.  Drink plenty of fluids. Fluids may help soothe an irritated throat. Hot fluids, such as tea or soup, may help your throat feel better.  Eat soft solids and drink plenty of liquids. Flavored ice pops, ice cream, scrambled eggs, sherbet, and gelatin dessert (such as Jell-O) may also soothe the throat.  Get lots of rest.  If you smoke, try to quit. If you can't quit, cut  "back as much as you can. Smoking can interfere with healing. If you need help quitting, talk to your doctor about stop-smoking programs and medicines. These can increase your chances of quitting for good.  Use a vaporizer or humidifier to add moisture to the air where you sleep. Follow the directions for cleaning the machine.  When should you call for help?   Call your doctor now or seek immediate medical care if:    You have new or worse symptoms of infection, such as:  A new or higher fever.  A fever with a stiff neck or severe headache.  New or worse trouble swallowing.  Pain that becomes much worse on one side of your throat.   Watch closely for changes in your health, and be sure to contact your doctor if:    You are not getting better after 2 days (48 hours) after taking an antibiotic.   Where can you learn more?  Go to https://www.[employer].net/patiented  Enter K625 in the search box to learn more about \"Strep Throat: Care Instructions.\"  Current as of: September 27, 2023               Content Version: 14.0    2770-3693 [employer].   Care instructions adapted under license by your healthcare professional. If you have questions about a medical condition or this instruction, always ask your healthcare professional. [employer] disclaims any warranty or liability for your use of this information.      "

## 2024-11-09 ENCOUNTER — OFFICE VISIT (OUTPATIENT)
Dept: FAMILY MEDICINE | Facility: OTHER | Age: 44
End: 2024-11-09
Attending: NURSE PRACTITIONER
Payer: COMMERCIAL

## 2024-11-09 VITALS
SYSTOLIC BLOOD PRESSURE: 132 MMHG | WEIGHT: 131.2 LBS | DIASTOLIC BLOOD PRESSURE: 72 MMHG | HEIGHT: 67 IN | BODY MASS INDEX: 20.59 KG/M2 | RESPIRATION RATE: 20 BRPM | TEMPERATURE: 98 F | HEART RATE: 67 BPM | OXYGEN SATURATION: 99 %

## 2024-11-09 DIAGNOSIS — J01.90 ACUTE SINUSITIS WITH SYMPTOMS > 10 DAYS: Primary | ICD-10-CM

## 2024-11-09 DIAGNOSIS — B00.1 RECURRENT COLD SORES: ICD-10-CM

## 2024-11-09 DIAGNOSIS — R05.1 ACUTE COUGH: ICD-10-CM

## 2024-11-09 PROCEDURE — 99213 OFFICE O/P EST LOW 20 MIN: CPT | Performed by: NURSE PRACTITIONER

## 2024-11-09 RX ORDER — VALACYCLOVIR HYDROCHLORIDE 1 G/1
1000 TABLET, FILM COATED ORAL 3 TIMES DAILY
Qty: 30 TABLET | Refills: 1 | Status: SHIPPED | OUTPATIENT
Start: 2024-11-09 | End: 2024-11-14

## 2024-11-09 ASSESSMENT — PAIN SCALES - GENERAL: PAINLEVEL_OUTOF10: NO PAIN (0)

## 2024-11-09 NOTE — NURSING NOTE
"Chief Complaint   Patient presents with    Cough    Chills    Sinus Problem     Patient here for cough, sinus pain and chills x2 weeks. Has cold sore that started yesterday. Would like refill of Valtrex.     Initial /72   Pulse 67   Temp 98  F (36.7  C) (Tympanic)   Resp 20   Ht 1.702 m (5' 7\")   Wt 59.5 kg (131 lb 3.2 oz)   LMP 10/08/2024 (Approximate)   SpO2 99%   BMI 20.55 kg/m   Estimated body mass index is 20.55 kg/m  as calculated from the following:    Height as of this encounter: 1.702 m (5' 7\").    Weight as of this encounter: 59.5 kg (131 lb 3.2 oz).  Medication Review: complete    The next two questions are to help us understand your food security.  If you are feeling you need any assistance in this area, we have resources available to support you today.          11/9/2024   SDOH- Food Insecurity   Within the past 12 months, did you worry that your food would run out before you got money to buy more? N   Within the past 12 months, did the food you bought just not last and you didn t have money to get more? N              Health Care Directive:  Patient does not have a Health Care Directive: Discussed advance care planning with patient; however, patient declined at this time.    Mary Hidalgo LPN      "

## 2024-11-09 NOTE — PROGRESS NOTES
ASSESSMENT/PLAN:     I have reviewed the nursing notes.  I have reviewed the findings, diagnosis, plan and need for follow up with the patient.        1. Recurrent cold sores  - valACYclovir (VALTREX) 1000 mg tablet; Take 1 tablet (1,000 mg) by mouth 3 times daily for 5 days.  Dispense: 30 tablet; Refill: 1    2. Acute sinusitis with symptoms > 10 days (Primary)  - amoxicillin-clavulanate (AUGMENTIN) 875-125 MG tablet; Take 1 tablet by mouth 2 times daily for 7 days.  Dispense: 14 tablet; Refill: 0    Symptomatic treatment - Encouraged fluids, salt water gargles, honey, elevation, humidifier, saline nasal spray, sinus rinse/netti pot, lozenges, tea, soup, smoothies, popsicles, topical vapor rub, rest, etc   May use sinus decongestant PRN  May use over-the-counter Tylenol or ibuprofen PRN  Discussed warning signs/symptoms indicative of need to f/u  Follow up if symptoms persist or worsen or concerns    3. Acute cough  No clinical indications for imaging or antibiotics at this time  Symptomatic treatment - Encouraged fluids, honey, elevation, lozenges, tea, soup, topical vapor rub, rest, etc   May use over the counter cough medication PRN  May use over-the-counter Tylenol or ibuprofen PRN  Discussed warning signs/symptoms indicative of need to f/u  Follow up if symptoms persist or worsen or concerns      I explained my diagnostic considerations and recommendations to the patient, who voiced understanding and agreement with the treatment plan. All questions were answered. We discussed potential side effects of any prescribed or recommended therapies, as well as expectations for response to treatments.    Jackelyn Talamantes NP  [facility]      SUBJECTIVE:   Jailene Mccoy is a 44 year old female who presents to clinic today for the following health issues:  Sinus    HPI  Persisting cough and sinus pain and congestion for the past 2 weeks.    No chest congestion, heaviness, tightness, or  "shortness of breath.  Cough is occasionally productive.    Nasal sinus congestion with drainage and post nasal.  Sore throat last week.  NO fevers but chills yesterday and today.   Tried Nyquil last night.  Ibuprofen last week.    Also hx of cold sores with acute outbreak yesterday and requesting refill of antiviral medication.      Past Medical History:   Diagnosis Date    Attention-deficit hyperactivity disorder     teen onset/meds thru [facility]    Hepatic hemangioma 2021    Personal history of other medical treatment (CODE)     G2Para 2-0-0-2     Past Surgical History:   Procedure Laterality Date    BUNIONECTOMY      3-,left     SECTION      ,breech presentation    chin surgery  2008    HERNIORRHAPHY VENTRAL N/A 2018    Epigastric Ventral Hernia Repair & Laparoscopic Tubal Ligation;  Surgeon: Bobby Jones MD;  Location: [facility] OR    LAPAROSCOPIC TUBAL LIGATION Bilateral 2018    Procedure: LAPAROSCOPIC TUBAL LIGATION;;  Surgeon: Ike Krishnan MD;  Location: [facility] OR    wisdom teeth       Social History     Tobacco Use    Smoking status: Never    Smokeless tobacco: Never   Substance Use Topics    Alcohol use: Yes     Comment: couple times per week on average     Current Outpatient Medications   Medication Sig Dispense Refill    amphetamine-dextroamphetamine (ADDERALL) 20 MG tablet Take 1 tablet (20 mg) by mouth daily      valACYclovir (VALTREX) 1000 mg tablet TAKE 1 TABLET(1000 MG) BY MOUTH TWICE DAILY FOR 5 DAYS 25 tablet 1    valACYclovir (VALTREX) 1000 mg tablet Take 1 tablet (1,000 mg) by mouth 2 times daily (Patient not taking: Reported on 2024) 25 tablet 1     No Known Allergies      Past medical history, past surgical history, current medications and allergies reviewed and accurate to the best of my knowledge.        OBJECTIVE:     /72   Pulse 67   Temp 98  F (36.7  C) (Tympanic)   Resp 20   Ht 1.702 m (5' 7\")   Wt 59.5 kg (131 lb 3.2 oz)   LMP 10/08/2024 " (Approximate)   SpO2 99%   BMI 20.55 kg/m    Body mass index is 20.55 kg/m .        Physical Exam  General Appearance: Well appearing adult female, appropriate appearance for age. No acute distress  Ears: Left TM intact, no erythema, serous effusion with bulging, no purulence.  Right TM intact, no erythema, serous effusion with bulging, no purulence.  Left auditory canal clear without drainage or bleeding.  Right auditory canal clear without drainage or bleeding.  Normal external ears, non tender.  Eyes: conjunctivae normal without erythema or irritation, corneas clear, no drainage or crusting, no eyelid swelling, pupils equal   Orophayrnx: moist mucous membranes, pharynx without erythema, tonsils without hypertrophy, tonsils without erythema, no tonsillar exudates, no oral lesions, no palate petechiae, no post nasal drip seen, no trismus, voice clear.    Sinuses:  Diffuse sinus tenderness upon palpation of the frontal, ethmoid, and maxillary sinuses  Nose:  Nares with erythema, edema, and moisture  Neck: supple without adenopathy  Respiratory: normal chest wall and respirations.  Normal effort.  Clear to auscultation bilaterally, no wheezing, crackles or rhonchi.  No increased work of breathing.  Mild dry cough appreciated.  Cardiac: RRR with no murmurs  Musculoskeletal:  Equal movement of bilateral upper extremities.  Equal movement of bilateral lower extremities.  Normal gait.    Psychological: normal affect, alert, oriented, and pleasant.

## 2024-11-20 DIAGNOSIS — J01.90 ACUTE SINUSITIS WITH SYMPTOMS > 10 DAYS: ICD-10-CM

## 2024-11-20 NOTE — TELEPHONE ENCOUNTER
[id number] of [place] sent Rx request for the following:      Requested Prescriptions   Pending Prescriptions Disp Refills    amoxicillin-clavulanate (AUGMENTIN) 875-125 MG tablet 14 tablet 0     Sig: Take 1 tablet by mouth 2 times daily.       There is no refill protocol information for this order          Last Prescription Date:   Discontinued 11/16/24  Last Fill Qty/Refills:         14, R-0    Last Office Visit:              11/9/24 [facility]   Future Office visit:           1/22/24      Medication discontinued.    Reshma Parker RN on 11/20/2024 at 3:53 PM

## 2025-01-22 ENCOUNTER — OFFICE VISIT (OUTPATIENT)
Dept: FAMILY MEDICINE | Facility: OTHER | Age: 45
End: 2025-01-22
Attending: FAMILY MEDICINE
Payer: COMMERCIAL

## 2025-01-22 VITALS
WEIGHT: 135 LBS | OXYGEN SATURATION: 99 % | BODY MASS INDEX: 21.19 KG/M2 | RESPIRATION RATE: 14 BRPM | TEMPERATURE: 97.1 F | SYSTOLIC BLOOD PRESSURE: 132 MMHG | DIASTOLIC BLOOD PRESSURE: 72 MMHG | HEART RATE: 89 BPM | HEIGHT: 67 IN

## 2025-01-22 DIAGNOSIS — Z00.00 PHYSICAL EXAM, ANNUAL: Primary | ICD-10-CM

## 2025-01-22 DIAGNOSIS — Z80.3 FAMILY HISTORY OF MALIGNANT NEOPLASM OF BREAST: ICD-10-CM

## 2025-01-22 DIAGNOSIS — Z12.11 COLON CANCER SCREENING: ICD-10-CM

## 2025-01-22 DIAGNOSIS — F90.2 ATTENTION DEFICIT HYPERACTIVITY DISORDER (ADHD), COMBINED TYPE: ICD-10-CM

## 2025-01-22 DIAGNOSIS — Z23 NEED FOR VACCINATION: ICD-10-CM

## 2025-01-22 DIAGNOSIS — K04.7 DENTAL INFECTION: ICD-10-CM

## 2025-01-22 PROCEDURE — 99396 PREV VISIT EST AGE 40-64: CPT | Mod: 25 | Performed by: FAMILY MEDICINE

## 2025-01-22 PROCEDURE — 90471 IMMUNIZATION ADMIN: CPT | Performed by: FAMILY MEDICINE

## 2025-01-22 PROCEDURE — 90715 TDAP VACCINE 7 YRS/> IM: CPT | Performed by: FAMILY MEDICINE

## 2025-01-22 PROCEDURE — G2211 COMPLEX E/M VISIT ADD ON: HCPCS | Performed by: FAMILY MEDICINE

## 2025-01-22 RX ORDER — DEXTROAMPHETAMINE SACCHARATE, AMPHETAMINE ASPARTATE, DEXTROAMPHETAMINE SULFATE AND AMPHETAMINE SULFATE 2.5; 2.5; 2.5; 2.5 MG/1; MG/1; MG/1; MG/1
TABLET ORAL
COMMUNITY
Start: 2024-12-16

## 2025-01-22 SDOH — HEALTH STABILITY: PHYSICAL HEALTH: ON AVERAGE, HOW MANY DAYS PER WEEK DO YOU ENGAGE IN MODERATE TO STRENUOUS EXERCISE (LIKE A BRISK WALK)?: 3 DAYS

## 2025-01-22 ASSESSMENT — PAIN SCALES - GENERAL: PAINLEVEL_OUTOF10: NO PAIN (0)

## 2025-01-22 ASSESSMENT — SOCIAL DETERMINANTS OF HEALTH (SDOH): HOW OFTEN DO YOU GET TOGETHER WITH FRIENDS OR RELATIVES?: TWICE A WEEK

## 2025-01-22 NOTE — PROGRESS NOTES
Preventive Care Visit  [facility]  DAISY VAZ MD, Family Medicine  Jan 22, 2025      Assessment & Plan       ICD-10-CM    1. Physical exam, annual  Z00.00       2. Need for vaccination  Z23 TDAP 7+ (ADACEL,BOOSTRIX)      3. Colon cancer screening  Z12.11 Colonoscopy Screening  Referral      4. Family history of malignant neoplasm of breast  Z80.3       5. Dental infection  K04.7       6. Attention deficit hyperactivity disorder (ADHD), combined type  F90.2          Boostrix updated today.  Colon cancer screening discussed and order placed for colonoscopy.  Continue annual mammogram due to FH  Patient has follow up with dentist scheduled   Continue mental health follow up for ADHD          Counseling  Appropriate preventive services were addressed with this patient via screening, questionnaire, or discussion as appropriate for fall prevention, nutrition, physical activity, Tobacco-use cessation, social engagement, weight loss and cognition.  Checklist reviewing preventive services available has been given to the patient.  Reviewed patient's diet, addressing concerns and/or questions.   She is at risk for lack of exercise and has been provided with information to increase physical activity for the benefit of her well-being.   The patient was instructed to see the dentist every 6 months.         Return in about 1 year (around 1/22/2026).    Jesica Dent is a 44 year old, presenting for the following:  Physical (Annual well visit)        1/22/2025    11:20 AM   Additional Questions   Roomed by ESTER Akhtar  Jailene Mccoy is a 44 year old female in for annual exam.     Root canal tomorrow - filling fell out a few months ago and now having issues with infection.   Abnormal mammogram - has follow up scheduled; FH of breast cancer in her mom   Dr Pedroza - every three months for ADHD follow up   Colon cancer screening now that 44.      Health Care  Directive  Patient does not have a Health Care Directive: Discussed advance care planning with patient; however, patient declined at this time.      1/22/2025   General Health   How would you rate your overall physical health? Good   Feel stress (tense, anxious, or unable to sleep) Not at all         1/22/2025   Nutrition   Three or more servings of calcium each day? (!) NO   Diet: Regular (no restrictions)   How many servings of fruit and vegetables per day? (!) 2-3   How many sweetened beverages each day? 0-1         1/22/2025   Exercise   Days per week of moderate/strenous exercise 3 days         1/22/2025   Social Factors   Frequency of gathering with friends or relatives Twice a week   Worry food won't last until get money to buy more No   Food not last or not have enough money for food? No   Do you have housing? (Housing is defined as stable permanent housing and does not include staying ouside in a car, in a tent, in an abandoned building, in an overnight shelter, or couch-surfing.) Yes   Are you worried about losing your housing? No   Lack of transportation? No   Unable to get utilities (heat,electricity)? No         1/22/2025   Dental   Dentist two times every year? (!) NO         1/22/2025   TB Screening   Were you born outside of the [place]? No         Today's PHQ-2 Score:       1/22/2025    11:17 AM   PHQ-2 ( 1999 Pfizer)   Q1: Little interest or pleasure in doing things 0   Q2: Feeling down, depressed or hopeless 0   PHQ-2 Score 0    Q1: Little interest or pleasure in doing things Not at all   Q2: Feeling down, depressed or hopeless Not at all   PHQ-2 Score 0       Patient-reported           1/22/2025   Substance Use   Alcohol more than 3/day or more than 7/wk No   Do you use any other substances recreationally? No     Social History     Tobacco Use    Smoking status: Never     Passive exposure: Never    Smokeless tobacco: Never   Vaping Use    Vaping status: Never Used   Substance Use Topics    Alcohol  use: Yes     Comment: couple times per week on average    Drug use: No           2025   LAST FHS-7 RESULTS   1st degree relative breast or ovarian cancer Yes   Any relative bilateral breast cancer No   Any male have breast cancer No   Any ONE woman have BOTH breast AND ovarian cancer No   Any woman with breast cancer before 50yrs No   2 or more relatives with breast AND/OR ovarian cancer No   2 or more relatives with breast AND/OR bowel cancer No        Mammogram Screening - Annual screen due to greater than 20% lifetime risk as estimated by Breast Cancer Risk Calculator        2025   STI Screening   New sexual partner(s) since last STI/HIV test? No     History of abnormal Pap smear: No - age 30- 64 PAP with HPV every 5 years recommended        Latest Ref Rng & Units 2021     2:20 PM   PAP / HPV   PAP (Historical)  NIL    HPV 16 DNA NEG^Negative Negative    HPV 18 DNA NEG^Negative Negative    Other HR HPV NEG^Negative Negative      ASCVD Risk   The ASCVD Risk score (Gerda DK, et al., 2019) failed to calculate for the following reasons:    The valid HDL cholesterol range is 20 to 100 mg/dL       Reviewed and updated as needed this visit by Provider         Jose Love            Past Medical History:   Diagnosis Date    Attention-deficit hyperactivity disorder     teen onset/meds thru [facility]    Hepatic hemangioma 2021    Personal history of other medical treatment (CODE)     G2Para 2-0-0-2     Past Surgical History:   Procedure Laterality Date    BUNIONECTOMY      3-,left     SECTION      ,breech presentation    chin surgery  2008    HERNIORRHAPHY VENTRAL N/A 2018    Epigastric Ventral Hernia Repair & Laparoscopic Tubal Ligation;  Surgeon: Bobby Jones MD;  Location:  OR    LAPAROSCOPIC TUBAL LIGATION Bilateral 2018    Procedure: LAPAROSCOPIC TUBAL LIGATION;;  Surgeon: Ike Krishnan MD;  Location:  OR    wisdom teeth       OB History    Para  "Term  AB Living   2 2 0 0 0 0   SAB IAB Ectopic Multiple Live Births   0 0 0 0 0         Review of Systems  No heartburn  No  concerns  History of acne     Objective    Exam  /72 (BP Location: Right arm, Patient Position: Sitting, Cuff Size: Adult Regular)   Pulse 89   Temp 97.1  F (36.2  C) (Temporal)   Resp 14   Ht 1.702 m (5' 7\")   Wt 61.2 kg (135 lb)   LMP 2025 (Exact Date)   SpO2 99%   Breastfeeding No   BMI 21.14 kg/m     Estimated body mass index is 21.14 kg/m  as calculated from the following:    Height as of this encounter: 1.702 m (5' 7\").    Weight as of this encounter: 61.2 kg (135 lb).    Physical Exam  GENERAL: alert and no distress  EYES: Eyes grossly normal to inspection, PERRL and conjunctivae and sclerae normal  HENT: right facial swelling   RESP: lungs clear to auscultation - no rales, rhonchi or wheezes  BREAST: normal without masses, tenderness or nipple discharge and no palpable axillary masses or adenopathy  CV: regular rate and rhythm, normal S1 S2, no S3 or S4, no murmur, click or rub, no peripheral edema  ABDOMEN: soft, nontender, no hepatosplenomegaly, no masses and bowel sounds normal  MS: no gross musculoskeletal defects noted, no edema  SKIN: no suspicious lesions or rashes  NEURO: Normal strength and tone, mentation intact and speech normal  PSYCH: mentation appears normal, affect normal/bright        Signed Electronically by: DAISY VAZ MD    "

## 2025-01-22 NOTE — NURSING NOTE
"Chief Complaint   Patient presents with    Physical     Annual well visit       Initial /72 (BP Location: Right arm, Patient Position: Sitting, Cuff Size: Adult Regular)   Pulse 89   Temp 97.1  F (36.2  C) (Temporal)   Resp 14   Ht 1.702 m (5' 7\")   Wt 61.2 kg (135 lb)   LMP 01/07/2025 (Exact Date)   SpO2 99%   Breastfeeding No   BMI 21.14 kg/m   Estimated body mass index is 21.14 kg/m  as calculated from the following:    Height as of this encounter: 1.702 m (5' 7\").    Weight as of this encounter: 61.2 kg (135 lb).    Medication Review: complete    The next two questions are to help us understand your food security.  If you are feeling you need any assistance in this area, we have resources available to support you today.          1/22/2025   SDOH- Food Insecurity   Within the past 12 months, did you worry that your food would run out before you got money to buy more? N   Within the past 12 months, did the food you bought just not last and you didn t have money to get more? N     Health Care Directive:  Patient does not have a Health Care Directive: Discussed advance care planning with patient; however, patient declined at this time.    Bhavani Alfonso LPN      "

## 2025-01-23 DIAGNOSIS — Z12.11 SPECIAL SCREENING FOR MALIGNANT NEOPLASMS, COLON: Primary | ICD-10-CM

## 2025-01-23 NOTE — TELEPHONE ENCOUNTER
Screening Questions for the Scheduling of Screening Colonoscopies   (If Colonoscopy is diagnostic, Provider should review the chart before scheduling.)  Are you younger than 45 or older than 80?  NO  Do you take aspirin or fish oil?  NO (if yes, tell patient to stop 1 week prior to Colonoscopy)  Do you take warfarin (Coumadin), clopidogrel (Plavix), apixaban (Eliquis), dabigatram (Pradaxa), rivaroxaban (Xarelto) or any blood thinner? NO  Do you take semaglutide (Ozempic or Wegovy), tirzepatide (Mounjaro or Zepbound), liraglutide (Victoza), or dulaglutide (Trulicity)? NO  Do you use oxygen or a CPAP at home?  NO  Do you have kidney disease? NO  Are you on dialysis? NO  Have you had a stroke or heart attack in the last year? NO  Have you had a stent in your heart or any blood vessel in the last year? NO  Have you had a transplant of any organ? NO  Have you had a colonoscopy or upper endoscopy (EGD) before? NO  Date of scheduled Colonoscopy. 05/07/2025  Provider ORDONEZ  Pharmacy [facility]

## 2025-01-27 RX ORDER — BISACODYL 5 MG/1
TABLET, DELAYED RELEASE ORAL
Qty: 2 TABLET | Refills: 0 | Status: SHIPPED | OUTPATIENT
Start: 2025-01-27

## 2025-01-27 RX ORDER — POLYETHYLENE GLYCOL 3350, SODIUM CHLORIDE, SODIUM BICARBONATE, POTASSIUM CHLORIDE 420; 11.2; 5.72; 1.48 G/4L; G/4L; G/4L; G/4L
4000 POWDER, FOR SOLUTION ORAL ONCE
Qty: 4000 ML | Refills: 0 | Status: SHIPPED | OUTPATIENT
Start: 2025-01-27 | End: 2025-01-27

## 2025-02-04 ENCOUNTER — HOSPITAL ENCOUNTER (OUTPATIENT)
Dept: MAMMOGRAPHY | Facility: OTHER | Age: 45
Discharge: HOME | End: 2025-02-04
Attending: FAMILY MEDICINE
Payer: COMMERCIAL

## 2025-02-04 ENCOUNTER — HOSPITAL ENCOUNTER (OUTPATIENT)
Dept: ULTRASOUND IMAGING | Facility: OTHER | Age: 45
Discharge: HOME | End: 2025-02-04
Attending: FAMILY MEDICINE
Payer: COMMERCIAL

## 2025-02-04 DIAGNOSIS — R92.8 ABNORMAL FINDING ON BREAST IMAGING: ICD-10-CM

## 2025-02-04 PROCEDURE — 77065 DX MAMMO INCL CAD UNI: CPT | Mod: RT

## 2025-02-04 PROCEDURE — 76642 ULTRASOUND BREAST LIMITED: CPT | Mod: RT

## 2025-02-04 PROCEDURE — 77061 BREAST TOMOSYNTHESIS UNI: CPT | Mod: RT

## 2025-06-10 ENCOUNTER — HOSPITAL ENCOUNTER (OUTPATIENT)
Facility: OTHER | Age: 45
Discharge: HOME | End: 2025-06-10
Attending: SURGERY | Admitting: SURGERY
Payer: COMMERCIAL

## 2025-06-10 ENCOUNTER — ANESTHESIA EVENT (OUTPATIENT)
Dept: SURGERY | Facility: OTHER | Age: 45
End: 2025-06-10
Payer: COMMERCIAL

## 2025-06-10 ENCOUNTER — ANESTHESIA (OUTPATIENT)
Dept: SURGERY | Facility: OTHER | Age: 45
End: 2025-06-10
Payer: COMMERCIAL

## 2025-06-10 VITALS
OXYGEN SATURATION: 99 % | WEIGHT: 132 LBS | RESPIRATION RATE: 16 BRPM | HEART RATE: 76 BPM | SYSTOLIC BLOOD PRESSURE: 159 MMHG | TEMPERATURE: 97.2 F | BODY MASS INDEX: 20.67 KG/M2 | DIASTOLIC BLOOD PRESSURE: 115 MMHG

## 2025-06-10 PROCEDURE — 258N000003 HC RX IP 258 OP 636: Performed by: SURGERY

## 2025-06-10 PROCEDURE — 999N000010 HC STATISTIC ANES STAT CODE-CRNA PER MINUTE: Performed by: SURGERY

## 2025-06-10 PROCEDURE — 45378 DIAGNOSTIC COLONOSCOPY: CPT | Performed by: SURGERY

## 2025-06-10 PROCEDURE — 250N000011 HC RX IP 250 OP 636: Performed by: NURSE ANESTHETIST, CERTIFIED REGISTERED

## 2025-06-10 PROCEDURE — 250N000009 HC RX 250: Performed by: NURSE ANESTHETIST, CERTIFIED REGISTERED

## 2025-06-10 PROCEDURE — G0121 COLON CA SCRN NOT HI RSK IND: HCPCS | Performed by: SURGERY

## 2025-06-10 RX ORDER — PROPOFOL 10 MG/ML
INJECTION, EMULSION INTRAVENOUS CONTINUOUS PRN
Status: DISCONTINUED | OUTPATIENT
Start: 2025-06-10 | End: 2025-06-10

## 2025-06-10 RX ORDER — LIDOCAINE 40 MG/G
CREAM TOPICAL
Status: DISCONTINUED | OUTPATIENT
Start: 2025-06-10 | End: 2025-06-10

## 2025-06-10 RX ORDER — PROPOFOL 10 MG/ML
INJECTION, EMULSION INTRAVENOUS PRN
Status: DISCONTINUED | OUTPATIENT
Start: 2025-06-10 | End: 2025-06-10

## 2025-06-10 RX ORDER — NALOXONE HYDROCHLORIDE 0.4 MG/ML
0.4 INJECTION, SOLUTION INTRAMUSCULAR; INTRAVENOUS; SUBCUTANEOUS
Status: DISCONTINUED | OUTPATIENT
Start: 2025-06-10 | End: 2025-06-10 | Stop reason: HOSPADM

## 2025-06-10 RX ORDER — LIDOCAINE 40 MG/G
CREAM TOPICAL
Status: DISCONTINUED | OUTPATIENT
Start: 2025-06-10 | End: 2025-06-10 | Stop reason: HOSPADM

## 2025-06-10 RX ORDER — NALOXONE HYDROCHLORIDE 0.4 MG/ML
0.2 INJECTION, SOLUTION INTRAMUSCULAR; INTRAVENOUS; SUBCUTANEOUS
Status: DISCONTINUED | OUTPATIENT
Start: 2025-06-10 | End: 2025-06-10 | Stop reason: HOSPADM

## 2025-06-10 RX ORDER — SODIUM CHLORIDE, SODIUM LACTATE, POTASSIUM CHLORIDE, CALCIUM CHLORIDE 600; 310; 30; 20 MG/100ML; MG/100ML; MG/100ML; MG/100ML
INJECTION, SOLUTION INTRAVENOUS CONTINUOUS
Status: DISCONTINUED | OUTPATIENT
Start: 2025-06-10 | End: 2025-06-10

## 2025-06-10 RX ORDER — ONDANSETRON 2 MG/ML
4 INJECTION INTRAMUSCULAR; INTRAVENOUS
Status: DISCONTINUED | OUTPATIENT
Start: 2025-06-10 | End: 2025-06-10 | Stop reason: HOSPADM

## 2025-06-10 RX ORDER — SODIUM CHLORIDE, SODIUM LACTATE, POTASSIUM CHLORIDE, CALCIUM CHLORIDE 600; 310; 30; 20 MG/100ML; MG/100ML; MG/100ML; MG/100ML
INJECTION, SOLUTION INTRAVENOUS CONTINUOUS
Status: DISCONTINUED | OUTPATIENT
Start: 2025-06-10 | End: 2025-06-10 | Stop reason: HOSPADM

## 2025-06-10 RX ORDER — LIDOCAINE HYDROCHLORIDE 20 MG/ML
INJECTION, SOLUTION INFILTRATION; PERINEURAL PRN
Status: DISCONTINUED | OUTPATIENT
Start: 2025-06-10 | End: 2025-06-10

## 2025-06-10 RX ORDER — FLUMAZENIL 0.1 MG/ML
0.2 INJECTION, SOLUTION INTRAVENOUS
Status: DISCONTINUED | OUTPATIENT
Start: 2025-06-10 | End: 2025-06-10 | Stop reason: HOSPADM

## 2025-06-10 RX ADMIN — SODIUM CHLORIDE, SODIUM LACTATE, POTASSIUM CHLORIDE, AND CALCIUM CHLORIDE: .6; .31; .03; .02 INJECTION, SOLUTION INTRAVENOUS at 12:23

## 2025-06-10 RX ADMIN — MIDAZOLAM 2 MG: 1 INJECTION INTRAMUSCULAR; INTRAVENOUS at 13:01

## 2025-06-10 RX ADMIN — PROPOFOL 50 MG: 10 INJECTION, EMULSION INTRAVENOUS at 13:32

## 2025-06-10 RX ADMIN — LIDOCAINE HYDROCHLORIDE 40 MG: 20 INJECTION, SOLUTION INFILTRATION; PERINEURAL at 13:31

## 2025-06-10 RX ADMIN — PROPOFOL 150 MCG/KG/MIN: 10 INJECTION, EMULSION INTRAVENOUS at 13:31

## 2025-06-10 RX ADMIN — PROPOFOL 50 MG: 10 INJECTION, EMULSION INTRAVENOUS at 13:31

## 2025-06-10 RX ADMIN — PROPOFOL 50 MG: 10 INJECTION, EMULSION INTRAVENOUS at 13:11

## 2025-06-10 ASSESSMENT — ACTIVITIES OF DAILY LIVING (ADL)
ADLS_ACUITY_SCORE: 41

## 2025-06-10 ASSESSMENT — ENCOUNTER SYMPTOMS: DYSRHYTHMIAS: 0

## 2025-06-10 NOTE — DISCHARGE INSTRUCTIONS
[facility]  Adult Discharge Orders & Instructions      For 24 hours after surgery:  Get plenty of rest.  A responsible adult must stay with you for at least 24 hours after you leave the hospital.   You may feel lightheaded.  IF so, sit for a few minutes before standing.  Have someone help you get up.   You may have a slight fever. Call the doctor if your fever is over 101 F (38.3 C) (taken under the tongue) or lasts longer than 24 hours.  You may have a dry mouth, a sore throat, muscle aches or trouble sleeping.  These should go away after 24 hours.  Do not make important or legal decisions.  6.   Do not drive or use heavy equipment.  If you have weakness or tingling, don't drive or use heavy equipment until this feeling goes away.  To contact a doctor, call    [phone]______________

## 2025-06-10 NOTE — OR NURSING
Pt tolerated food and fluids without nausea. Denies pain. Steady on feet. AVS reviewed with pt and sister in law. Ambulated out with RN.

## 2025-06-10 NOTE — ANESTHESIA CARE TRANSFER NOTE
Patient: Jailene Mccoy    Procedure: Procedure(s):  Colonoscopy       Diagnosis: Screen for colon cancer [Z12.11]  Diagnosis Additional Information: No value filed.    Anesthesia Type:   MAC     Note:    Oropharynx: oropharynx clear of all foreign objects and spontaneously breathing  Level of Consciousness: drowsy  Oxygen Supplementation: room air    Independent Airway: airway patency satisfactory and stable  Dentition: dentition unchanged  Vital Signs Stable: post-procedure vital signs reviewed and stable  Report to RN Given: handoff report given  Patient transferred to: Phase II    Handoff Report: Identifed the Patient, Identified the Reponsible Provider, Reviewed the pertinent medical history, Discussed the surgical course, Reviewed Intra-OP anesthesia mangement and issues during anesthesia, Set expectations for post-procedure period and Allowed opportunity for questions and acknowledgement of understanding      Vitals:  Vitals Value Taken Time   BP     Temp     Pulse     Resp     SpO2 96 % 06/10/25 13:54   Vitals shown include unfiled device data.    Electronically Signed By: Reshma Stephenson  Eileen 10, 2025  1:55 PM

## 2025-06-10 NOTE — ANESTHESIA POSTPROCEDURE EVALUATION
Patient: Jailene Mccoy    Procedure: Procedure(s):  Colonoscopy       Anesthesia Type:  MAC    Note:  Disposition: Outpatient   Postop Pain Control: Uneventful            Sign Out: Well controlled pain   PONV: No   Neuro/Psych: Uneventful            Sign Out: Acceptable/Baseline neuro status   Airway/Respiratory: Uneventful            Sign Out: Acceptable/Baseline resp. status   CV/Hemodynamics: Uneventful            Sign Out: Acceptable CV status; No obvious hypovolemia; No obvious fluid overload   Other NRE: NONE   DID A NON-ROUTINE EVENT OCCUR? No       Last vitals:  Vitals Value Taken Time   /115 06/10/25 14:15   Temp 97.2  F (36.2  C) 06/10/25 13:52   Pulse 76 06/10/25 14:15   Resp 16 06/10/25 14:00   SpO2 100 % 06/10/25 14:28   Vitals shown include unfiled device data.    Electronically Signed By: SAMIR Beckman CRNA  Eileen 10, 2025  3:16 PM

## 2025-06-10 NOTE — H&P
GENERAL SURGERY CONSULTATION NOTE    Jailene Mccoy   [address]   [place] [address]  44 year old  female    Primary Care Provider:  Kayla Dorantes      HPI: Jailene Mccoy presents to day surgery in need of colonoscopy for screening for colon cancer.   Jailene Mccoy denies family history of colon cancer. Patient denies change in bowel habits or blood in stools. Previous colonoscopy was never.     REVIEW OF SYSTEMS:    GENERAL: No fevers or chills. Denies fatigue, recent weight loss.  HEENT: No sinus drainage. No changes with vision or hearing. No difficulty swallowing.   LYMPHATICS:  Noswollen nodes in axilla, neck or groin.  CARDIOVASCULAR: Denies chest pain, palpitations and dyspnea on exertion.  PULMONARY: No shortness of breath or cough. No increase in sputum production.  GI: Denies melena,bright red blood in stools. No hematemesis. No constipation or diarrhea.  : No dysuria or hematuria.  SKIN: No recent rashes or ulcers.   HEMATOLOGY:  No history of easy bruising or bleeding.  ENDOCRINE:  No history of diabetes or thyroid problems.  NEUROLOGY:  No history of seizures or headaches. No motor or sensory changes.        Patient Active Problem List   Diagnosis    Acne    Attention deficit disorder    Allergic state    Anxiety state    Bunion, left    Contraceptive management    Raynaud phenomenon    Hepatic hemangioma       Past Medical History:   Diagnosis Date    Attention-deficit hyperactivity disorder     teen onset/meds thru [facility]    Hepatic hemangioma 2021    Personal history of other medical treatment (CODE)     G2Para 2-0-0-2       Past Surgical History:   Procedure Laterality Date    BUNIONECTOMY      3-,left     SECTION      ,breech presentation    [facility] surgery  2008    HERNIORRHAPHY VENTRAL N/A 2018    Epigastric Ventral Hernia Repair & Laparoscopic Tubal Ligation;  Surgeon: Bobby Jones MD;  Location: [facility] OR    LAPAROSCOPIC TUBAL  LIGATION Bilateral 04/13/2018    Procedure: LAPAROSCOPIC TUBAL LIGATION;;  Surgeon: Ike Krishnan MD;  Location: [facility] OR    wisdom teeth         Family History   Problem Relation Age of Onset    Other - See Comments Mother         ADD    Breast Cancer Mother     Attention Deficit Disorder Sister     Breast Cancer Maternal Grandmother         Cancer-breast,and lung cancer    Hypertension Paternal Grandmother        Social History     Social History Narrative    Elementary education degree, in Masters program [employer] 2015.   at [employer]    Children:  Yoselyn Griffith - Adilson Mccoy       Social History     Socioeconomic History    Marital status:      Spouse name: Adilson    Number of children: 2    Years of education: 18    Highest education level: Master's degree (e.g., MA, MS, Natanael, MEd, MSW, KAMILA)   Occupational History     Employer:    Tobacco Use    Smoking status: Never     Passive exposure: Never    Smokeless tobacco: Never   Vaping Use    Vaping status: Never Used   Substance and Sexual Activity    Alcohol use: Yes     Comment: couple times per week on average    Drug use: No    Sexual activity: Yes     Partners: Male     Birth control/protection: Female Surgical     Comment: TL   Other Topics Concern    Parent/sibling w/ CABG, MI or angioplasty before 65F 55M? Not Asked   Social History Narrative    Elementary education degree, in Masters program [employer] 2015.   at [employer]    Children:  Yoselyn Griffith      Kassandrarosalba - Adilson Mccoy     Social Drivers of Health     Financial Resource Strain: Low Risk  (1/22/2025)    Financial Resource Strain     Within the past 12 months, have you or your family members you live with been unable to get utilities (heat, electricity) when it was really needed?: No   Food Insecurity: Low Risk  (1/22/2025)    Food Insecurity     Within the past 12 months, did you worry that your food would run out before you got money to buy more?: No     Within  the past 12 months, did the food you bought just not last and you didn t have money to get more?: No   Transportation Needs: Low Risk  (1/22/2025)    Transportation Needs     Within the past 12 months, has lack of transportation kept you from medical appointments, getting your medicines, non-medical meetings or appointments, work, or from getting things that you need?: No   Physical Activity: Unknown (1/22/2025)    Exercise Vital Sign     Days of Exercise per Week: 3 days     Minutes of Exercise per Session: Not on file   Stress: No Stress Concern Present (1/22/2025)    [place] [employer] - Occupational Stress Questionnaire     Feeling of Stress : Not at all   Social Connections: Unknown (1/22/2025)    Social Connection and Isolation Panel [NHANES]     Frequency of Communication with Friends and Family: Not on file     Frequency of Social Gatherings with Friends and Family: Twice a week     Attends Sikhism Services: Not on file     Active Member of Clubs or Organizations: Not on file     Attends Club or Organization Meetings: Not on file     Marital Status: Not on file   Interpersonal Safety: Low Risk  (6/10/2025)    Interpersonal Safety     Do you feel physically and emotionally safe where you currently live?: Yes     Within the past 12 months, have you been hit, slapped, kicked or otherwise physically hurt by someone?: No     Within the past 12 months, have you been humiliated or emotionally abused in other ways by your partner or ex-partner?: No   Housing Stability: Low Risk  (1/22/2025)    Housing Stability     Do you have housing? : Yes     Are you worried about losing your housing?: No       Current Facility-Administered Medications   Medication Dose Route Frequency Provider Last Rate Last Admin    lactated ringers infusion   Intravenous Continuous Jayda Fuller MD 30 mL/hr at 06/10/25 1223 New Bag at 06/10/25 1223    lidocaine (LMX4) cream   Topical Q1H PRN Jayda Fuller MD         lidocaine 1 % 0.1-1 mL  0.1-1 mL Other Q1H PRN Jayda Fuller MD        ondansetron (ZOFRAN) injection 4 mg  4 mg Intravenous Once PRN Jayda Fuller MD        sodium chloride (PF) 0.9% PF flush 3 mL  3 mL Intracatheter Q8H Jayda Fuller MD        sodium chloride (PF) 0.9% PF flush 3 mL  3 mL Intracatheter q1 min prn Jayda Fuller MD        sodium chloride (PF) 0.9% PF flush 3 mL  3 mL Intracatheter Q8H Praveen Gonzalez MD        sodium chloride (PF) 0.9% PF flush 3 mL  3 mL Intracatheter q1 min prn Praveen Gonzalez MD             ALLERGIES/SENSITIVITIES: No Known Allergies    PHYSICAL EXAM:     BP (!) 178/111   Pulse 66   Temp 98.6  F (37  C) (Tympanic)   Resp 16   Wt 59.9 kg (132 lb)   SpO2 100%   BMI 20.67 kg/m      General Appearance:   Sitting up in bed, no apparent distress  HEENT: Pupils are equal and reactive, no scleral icterus   Heart & CV:  RRR, no murmur.  LUNGS: No increased work of breathing. Lungs are CTA B/L, no wheezing or crackles.  Abd:  soft, non-tender, no masses   Ext: no lower extremity edema   Neuro: alert and oriented, normal speech and mentation         CONSULTATION ASSESSMENT AND PLAN:    44 year old female with average risk for colon cancer.      The technical details of colonoscopy were discussed with the patient along with the risks and benefits to include bleeding, perforation and incomplete study. Jailene Mccoy demonstrated understanding and is willing to proceed.       Praveen Gonzalez MD on 6/10/2025 at 1:16 PM

## 2025-06-10 NOTE — ANESTHESIA PREPROCEDURE EVALUATION
Anesthesia Pre-Procedure Evaluation    Patient: Jailene Mccoy   MRN: [id number] : 1980          Procedure : Procedure(s):  Colonoscopy         Past Medical History:   Diagnosis Date    Attention-deficit hyperactivity disorder     teen onset/meds thru [facility]    Hepatic hemangioma 2021    Personal history of other medical treatment (CODE)     G2Para 2-0-0-2      Past Surgical History:   Procedure Laterality Date    BUNIONECTOMY      3-,left     SECTION      ,breech presentation    chin surgery  2008    HERNIORRHAPHY VENTRAL N/A 2018    Epigastric Ventral Hernia Repair & Laparoscopic Tubal Ligation;  Surgeon: Bobby Jones MD;  Location: [facility] OR    LAPAROSCOPIC TUBAL LIGATION Bilateral 2018    Procedure: LAPAROSCOPIC TUBAL LIGATION;;  Surgeon: Ike Krishnan MD;  Location: [facility] OR    wisdom teeth        No Known Allergies   Social History     Tobacco Use    Smoking status: Never     Passive exposure: Never    Smokeless tobacco: Never   Substance Use Topics    Alcohol use: Yes     Comment: couple times per week on average      Wt Readings from Last 1 Encounters:   25 61.2 kg (135 lb)        Anesthesia Evaluation   Pt has had prior anesthetic.     No history of anesthetic complications       ROS/MED HX  ENT/Pulmonary:     (+)           allergic rhinitis,                             Neurologic:       Cardiovascular: Comment: Hx of SVT d/t stress. Possible WPW syndrome per cardiology. 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     (+)  - -   -  - -                          Irregular Heartbeat/Palpitations,         (-) arrhythmias   METS/Exercise Tolerance: >4 METS    Hematologic:       Musculoskeletal:       GI/Hepatic:       Renal/Genitourinary:       Endo:       Psychiatric/Substance Use: Comment: ADHD    (+) psychiatric history anxiety       Infectious Disease:       Malignancy:       Other:       "        Physical Exam  Airway  Mallampati: II  TM distance: >3 FB  Neck ROM: full  Upper bite lip test: II  Mouth opening: >= 4 cm    Cardiovascular - normal exam  Rhythm: regular  Rate: normal rate   Comments: Hx of SVT d/t stress. Possible WPW syndrome per cardiology. 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   Dental   (+) Minor Abnormalities - some fillings, tiny chips      Pulmonary - normal examBreath sounds clear to auscultation        Neurological - normal exam  She appears awake, alert and oriented x3.    Other Findings       OUTSIDE LABS:  CBC:   Lab Results   Component Value Date    WBC 7.0 03/09/2021    WBC 6.2 02/15/2021    HGB 12.0 06/06/2022    HGB 12.2 03/09/2021    HCT 36.5 03/09/2021    HCT 42.9 02/15/2021     03/09/2021     02/15/2021     BMP:   Lab Results   Component Value Date     06/06/2022     03/09/2021    POTASSIUM 3.8 06/06/2022    POTASSIUM 3.6 03/09/2021    CHLORIDE 102 06/06/2022    CHLORIDE 104 03/09/2021    CO2 28 06/06/2022    CO2 25 03/09/2021    BUN 25 06/06/2022    BUN 23 03/09/2021    CR 0.98 06/06/2022    CR 0.94 03/09/2021    GLC 88 06/06/2022     (H) 03/09/2021     COAGS: No results found for: \"PTT\", \"INR\", \"FIBR\"  POC:   Lab Results   Component Value Date    HCG Negative 04/13/2018     HEPATIC:   Lab Results   Component Value Date    ALBUMIN 4.1 03/09/2021    PROTTOTAL 7.1 03/09/2021    ALT 14 03/09/2021    AST 19 03/09/2021    ALKPHOS 33 (L) 03/09/2021    BILITOTAL 0.5 03/09/2021     OTHER:   Lab Results   Component Value Date    ADELINA 9.1 06/06/2022       Anesthesia Plan    ASA Status:  2      NPO Status: NPO Appropriate   Anesthesia Type: MAC.  Airway: natural airway.  Induction: intravenous.  Maintenance: Balanced.   Techniques and Equipment:       - Monitoring Plan: standard ASA monitoring     Consents    Anesthesia Plan(s) and associated risks, benefits, and realistic " alternatives discussed. Questions answered and patient/representative(s) expressed understanding.     - Discussed:     - Discussed with:  Patient        - Pt is DNR/DNI Status: no DNR          Postoperative Care         Comments:    Other Comments: Risks, benefits and alternatives discussed and would like to proceed. General anesthesia ok if indicated.                    SAMIR Beckman CRNA    I have reviewed the pertinent notes and labs in the chart from the past 30 days and (re)examined the patient.  Any updates or changes from those notes are reflected in this note.    Clinically Significant Risk Factors Present on Admission

## 2025-08-04 ENCOUNTER — TELEPHONE (OUTPATIENT)
Dept: FAMILY MEDICINE | Facility: OTHER | Age: 45
End: 2025-08-04
Payer: COMMERCIAL

## (undated) DEVICE — ENDO KIT COMPLIANCE DYKENDOCMPLY

## (undated) DEVICE — ESU HOLDER LAP INST DISP PURPLE LONG 330MM H-PRO-330

## (undated) DEVICE — SU MONOCRYL 3-0 PS-2 18" UND Y497G

## (undated) DEVICE — TUBING INSUFFLATOR W/FILTER OLYMPUS WA95005A

## (undated) DEVICE — NDL COUNTER 40CT  31142311

## (undated) DEVICE — DECANTER VIAL 2006S

## (undated) DEVICE — ANTIFOG SOLUTION W/FOAM PAD CF-1001

## (undated) DEVICE — TROCAR KII SLEEVE 5MM X 100MM 12/BX

## (undated) DEVICE — SUCTION MANIFOLD NEPTUNE 2 SYS 4 PORT 0702-020-000

## (undated) DEVICE — SOL WATER 1500ML

## (undated) DEVICE — ENDO BRUSH CHANNEL MASTER CLEANING 2-4.2MM BW-412T

## (undated) DEVICE — DRSG MEDIPORE 2X2 3/4" 3562

## (undated) DEVICE — TUBING SUCTION 10'X3/16" N510

## (undated) DEVICE — BLADE KNIFE SURG 15 371115

## (undated) DEVICE — SU PROLENE 2-0 CT-2 30" 8411H

## (undated) DEVICE — LIGHT HANDLES PLASTIC

## (undated) DEVICE — SLEEVE COMPRESSION SCD KNEE MED 74022

## (undated) DEVICE — VERRES NEEDLE 120MM DISPOSABLE 12/BX

## (undated) DEVICE — SU MONOCRYL 4-0 PC-3 18" UND Y845G

## (undated) DEVICE — GLOVE BIOGEL INDICATOR 7.5 LF 41675

## (undated) DEVICE — ENDO TROCAR FIRST ENTRY KII FIOS ADV FIX 05X100MM CFF03

## (undated) DEVICE — Device

## (undated) DEVICE — PREP CHLORAPREP 26ML TINTED ORANGE  260815

## (undated) DEVICE — SU VICRYL 0 CT-1 CR 8X27" UND JJ41G

## (undated) DEVICE — GLOVE PROTEXIS POWDER FREE SMT 7.5  2D72PT75X

## (undated) DEVICE — BLADE KNIFE SURG 11 371111

## (undated) DEVICE — PACK MAJOR LAPAROTOMY LF SBA15MLFCA

## (undated) DEVICE — DRSG STERI STRIP 1/2X4" R1547

## (undated) DEVICE — ESU GROUND PAD ADULT W/CORD E7507

## (undated) DEVICE — PREP CHLORAPREP 26ML TINTED GREEN 260825

## (undated) RX ORDER — PROPOFOL 10 MG/ML
INJECTION, EMULSION INTRAVENOUS
Status: DISPENSED
Start: 2018-04-13

## (undated) RX ORDER — ACETAMINOPHEN 10 MG/ML
INJECTION, SOLUTION INTRAVENOUS
Status: DISPENSED
Start: 2018-04-13

## (undated) RX ORDER — PROPOFOL 10 MG/ML
INJECTION, EMULSION INTRAVENOUS
Status: DISPENSED
Start: 2025-06-10

## (undated) RX ORDER — MEDROXYPROGESTERONE ACETATE 150 MG/ML
INJECTION, SUSPENSION INTRAMUSCULAR
Status: DISPENSED
Start: 2021-05-12

## (undated) RX ORDER — KETOROLAC TROMETHAMINE 30 MG/ML
INJECTION, SOLUTION INTRAMUSCULAR; INTRAVENOUS
Status: DISPENSED
Start: 2018-04-13

## (undated) RX ORDER — FENTANYL CITRATE 50 UG/ML
INJECTION, SOLUTION INTRAMUSCULAR; INTRAVENOUS
Status: DISPENSED
Start: 2018-04-13

## (undated) RX ORDER — LIDOCAINE HYDROCHLORIDE 10 MG/ML
INJECTION, SOLUTION EPIDURAL; INFILTRATION; INTRACAUDAL; PERINEURAL
Status: DISPENSED
Start: 2018-04-13

## (undated) RX ORDER — KETAMINE HYDROCHLORIDE 50 MG/ML
INJECTION, SOLUTION INTRAMUSCULAR; INTRAVENOUS
Status: DISPENSED
Start: 2018-04-13

## (undated) RX ORDER — DEXAMETHASONE SODIUM PHOSPHATE 4 MG/ML
INJECTION, SOLUTION INTRA-ARTICULAR; INTRALESIONAL; INTRAMUSCULAR; INTRAVENOUS; SOFT TISSUE
Status: DISPENSED
Start: 2018-04-13

## (undated) RX ORDER — LIDOCAINE HYDROCHLORIDE 20 MG/ML
INJECTION, SOLUTION EPIDURAL; INFILTRATION; INTRACAUDAL; PERINEURAL
Status: DISPENSED
Start: 2018-04-13

## (undated) RX ORDER — ONDANSETRON 2 MG/ML
INJECTION INTRAMUSCULAR; INTRAVENOUS
Status: DISPENSED
Start: 2018-04-13

## (undated) RX ORDER — SODIUM CHLORIDE, SODIUM LACTATE, POTASSIUM CHLORIDE, CALCIUM CHLORIDE 600; 310; 30; 20 MG/100ML; MG/100ML; MG/100ML; MG/100ML
INJECTION, SOLUTION INTRAVENOUS
Status: DISPENSED
Start: 2018-04-13

## (undated) RX ORDER — CEFAZOLIN SODIUM 2 G/100ML
INJECTION, SOLUTION INTRAVENOUS
Status: DISPENSED
Start: 2018-04-13

## (undated) RX ORDER — OXYCODONE AND ACETAMINOPHEN 5; 325 MG/1; MG/1
TABLET ORAL
Status: DISPENSED
Start: 2018-04-13

## (undated) RX ORDER — GLYCOPYRROLATE 0.2 MG/ML
INJECTION, SOLUTION INTRAMUSCULAR; INTRAVENOUS
Status: DISPENSED
Start: 2018-04-13

## (undated) RX ORDER — BUPIVACAINE HYDROCHLORIDE AND EPINEPHRINE 5; 5 MG/ML; UG/ML
INJECTION, SOLUTION EPIDURAL; INTRACAUDAL; PERINEURAL
Status: DISPENSED
Start: 2018-04-13

## (undated) RX ORDER — NEOMYCIN/BACITRACIN/POLYMYXINB 3.5-400-5K
OINTMENT (GRAM) TOPICAL
Status: DISPENSED
Start: 2019-08-27

## (undated) RX ORDER — NEOSTIGMINE METHYLSULFATE 0.5 MG/ML
INJECTION INTRAVENOUS
Status: DISPENSED
Start: 2018-04-13